# Patient Record
Sex: FEMALE | Race: BLACK OR AFRICAN AMERICAN | URBAN - METROPOLITAN AREA
[De-identification: names, ages, dates, MRNs, and addresses within clinical notes are randomized per-mention and may not be internally consistent; named-entity substitution may affect disease eponyms.]

---

## 2017-01-13 LAB
AVG GLU, 10930: 118 MG/DL (ref 91–123)
HBA1C MFR BLD HPLC: 5.7 % (ref 4.8–5.9)

## 2017-01-23 NOTE — PROGRESS NOTES
Normal a1c. Will discuss @ upcoming visit.   1/30/2017  7:30 AM    Oksana Fisher MD       [address] [place] [address]

## 2017-01-30 ENCOUNTER — OFFICE VISIT (OUTPATIENT)
Dept: FAMILY MEDICINE CLINIC | Age: 57
End: 2017-01-30

## 2017-01-30 VITALS
OXYGEN SATURATION: 97 % | SYSTOLIC BLOOD PRESSURE: 126 MMHG | HEIGHT: 66 IN | HEART RATE: 72 BPM | TEMPERATURE: 96.9 F | DIASTOLIC BLOOD PRESSURE: 86 MMHG | WEIGHT: 208.8 LBS | BODY MASS INDEX: 33.56 KG/M2 | RESPIRATION RATE: 18 BRPM

## 2017-01-30 DIAGNOSIS — G47.62 NOCTURNAL LEG CRAMPS: ICD-10-CM

## 2017-01-30 DIAGNOSIS — E66.9 OBESITY, UNSPECIFIED OBESITY SEVERITY, UNSPECIFIED OBESITY TYPE: ICD-10-CM

## 2017-01-30 DIAGNOSIS — E55.9 VITAMIN D DEFICIENCY: ICD-10-CM

## 2017-01-30 DIAGNOSIS — G47.26 SHIFT WORK SLEEP DISORDER: ICD-10-CM

## 2017-01-30 DIAGNOSIS — R06.83 SNORING: ICD-10-CM

## 2017-01-30 DIAGNOSIS — Z00.00 ROUTINE HEALTH MAINTENANCE: Primary | ICD-10-CM

## 2017-01-30 RX ORDER — LANOLIN ALCOHOL/MO/W.PET/CERES
65 CREAM (GRAM) TOPICAL
COMMUNITY
End: 2019-10-08

## 2017-01-30 RX ORDER — BISMUTH SUBSALICYLATE 262 MG
1 TABLET,CHEWABLE ORAL DAILY
COMMUNITY

## 2017-01-30 NOTE — PROGRESS NOTES
[facility], [place]  Primary Care Office Visit - Complete Physical    Iris Radha Li is a 61 y.o. female presenting for annual physical.  : 1960     Assessment/Plan:   Kate was seen today for complete physical and fatigue. Diagnoses and all orders for this visit:    Routine health maintenance  Up to date on HM. Up to date on Td (not on aP). Obesity, unspecified obesity severity, unspecified obesity type  I have reviewed/discussed the above normal BMI with the patient. I have recommended the following interventions: dietary management education, guidance, and counseling . The plan is as follows: I have counseled this patient on diet and exercise regimens. .      Vitamin D deficiency  Well controlled. Last VitD wnl. Continue OTC VitD. Shift work sleep disorder  Snoring  epworth score = 7  New issue, causing fatigue. Encourage good sleep hygiene, shifting back to her usual schedule. If no improvement in fatigue in 2-3 weeks, to consider sleep medicine referral.    Nocturnal leg cramps  New issue. Continue OTC mag & potassium supplements. Start HEP for stretching, included in AVS      Management plan & patient instructions reviewed with Kate Herzog, who voiced understanding. This document may have been created with the aid of dictation software. Text may contain errors, particularly phonetic errors. Teodoro Redman MD  Internal Medicine, Family Medicine & Sports Medicine  2017, 7:50 AM    Patient Instructions (provided in AVS): To Do:  · Do some of the exercises below to keep your lower legs nice & flexible  · If your fatigue continues after a month. ..  Let me know and we will consider a sleep medicine referral    Calf Strain: Rehab Exercises    History:   Marian Higgins is a 61 y.o. female presenting for an annual physical.      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 Feels as though she could take a nap during the day, although she does not have the opportunity to do so, since she works as a . She does note that this fatigue seems to be improving over time, however is still ongoing. Her last night shift at the post office was 1/5/2017. She falls asleep at 9 PM, but then wakes up at 4:30 in the morning. She does snore. Also notes some nocturnal leg cramps, that have improved but not resolved with over-the-counter magnesium and potassium supplements. Otherwise is doing well. Peculiar Sleepiness Score: 7      History reviewed. No pertinent past medical history. Past Surgical History   Procedure Laterality Date    Hx tubal ligation  12    Hx gyn  2007     fibroid embolization      reports that she quit smoking about 34 years ago. She smoked 0.10 packs per day. She has never used smokeless tobacco. She reports that she drinks about 0.5 oz of alcohol per week  She reports that she does not use illicit drugs.   Social History     Social History Narrative     History   Smoking Status    Former Smoker    Packs/day: 0.10    Quit date: 3/7/1982   Smokeless Tobacco    Never Used     Family History   Problem Relation Age of Onset    Asthma Mother     Diabetes Mother     Diabetes Father     Hypertension Father     Heart Disease Maternal Grandfather 80     MI    Stroke Paternal Grandmother 68    Cancer Paternal Grandfather      Lung cancer     No Known Allergies    Problem List:      Patient Active Problem List    Diagnosis    Routine health maintenance     - mammo: normal (11/3/2015)      Postmenopausal state    Obesity    Varicose veins    Vitamin D deficiency    Back pain    Hepatitis B core antibody positive     Positive for Hepatitis B core antibody & surface antibody = \"past Hep B infection now immune\"         Medications:     Current Outpatient Prescriptions   Medication Sig    ferrous sulfate (IRON) 325 mg (65 mg iron) tablet Take 65 mg by mouth Daily (before breakfast).  multivitamin (ONE A DAY) tablet Take 1 Tab by mouth daily.  POTASSIUM CITRATE PO Take  by mouth daily. No current facility-administered medications for this visit. Review of Systems:     Review of Systems   Constitutional: Positive for malaise/fatigue. HENT: Negative. Eyes: Negative. Respiratory: Negative. Cardiovascular: Negative. Gastrointestinal: Negative. Genitourinary: Negative. Musculoskeletal: Positive for myalgias. Leg cramps   Skin: Negative. Neurological: Negative. Endo/Heme/Allergies: Negative. Psychiatric/Behavioral: Negative. Physical Assessment:   VS:    Visit Vitals    /86 (BP 1 Location: Right arm, BP Patient Position: Sitting)    Pulse 72    Temp 96.9 °F (36.1 °C) (Oral)    Resp 18    Ht 5' 6\" (1.676 m)    Wt 208 lb 12.8 oz (94.7 kg)    SpO2 97%    BMI 33.7 kg/m2       General:     Well-developed, well-nourished obese female, in NAD    Head:      PERRL, EOMI.  MMM, fair dentition, oropharynx otherwise WNL. No facial asymmetry noted. Ears:    Bilateral TMs wnl. Bilateral EACs wnl. Neck:      Neck supple, no thyromegaly  Cardiovasc:     No JVD. RRR, no MRG. Pulses 2+ and symmetric at distal extremities. Pulmonary:     Lungs clear bilaterally. Normal respiratory effort. Extremities:     No edema, no TTP bilateral calves. No joint effusions. LEs warm and well-perfused. (+) tight bilateral heel cords  Neuro:     Alert and oriented, CNs II-XII intact, no focal deficits. Skin:      No rashes noted. Psych:    pleasant, and conversant. Affect, mood & judgment appropriate.         Recent Labs & Imaging:     Lab Results   Component Value Date/Time    WBC 4.2 01/12/2017 10:56 AM    HGB 14.5 01/12/2017 10:56 AM    HCT 43.7 01/12/2017 10:56 AM    PLATELET 256 87/12/1508 10:56 AM    MCV 90 01/12/2017 10:56 AM     Lab Results   Component Value Date/Time    Sodium 145 01/12/2017 10:56 AM    Potassium 4.9 01/12/2017 10:56 AM    Chloride 102 01/12/2017 10:56 AM    CO2 26 01/12/2017 10:56 AM    Anion gap 17.0 01/12/2017 10:56 AM    Glucose 89 01/12/2017 10:56 AM    BUN 14 01/12/2017 10:56 AM    Creatinine 0.7 01/12/2017 10:56 AM    BUN/Creatinine ratio 21 03/11/2013 12:00 AM    GFR est non-AA 61 03/11/2013 12:00 AM    Calcium 9.9 01/12/2017 10:56 AM    Bilirubin, total 0.6 01/12/2017 10:56 AM    ALT 34 01/12/2017 10:56 AM    AST 24 01/12/2017 10:56 AM    Alk.  phosphatase 68 01/12/2017 10:56 AM    Protein, total 7.3 01/12/2017 10:56 AM    Albumin 4.6 01/12/2017 10:56 AM    Globulin 2.7 01/12/2017 10:56 AM    A-G Ratio 1.7 01/12/2017 10:56 AM     Lab Results   Component Value Date/Time    Cholesterol, total 214 01/12/2017 10:56 AM    HDL Cholesterol 40 01/12/2017 10:56 AM    LDL, calculated 152 01/12/2017 10:56 AM    VLDL, calculated 22 01/12/2017 10:56 AM    Triglyceride 112 01/12/2017 10:56 AM     Lab Results   Component Value Date/Time    TSH 1.17 01/12/2017 10:56 AM     Lab Results   Component Value Date/Time    VITAMIN D, 25-HYDROXY 39.9 01/12/2017 10:56 AM       Lab Results   Component Value Date/Time    Hemoglobin A1c 5.7 01/12/2017 10:56 AM

## 2017-01-30 NOTE — PATIENT INSTRUCTIONS
To Do:  · Do some of the exercises below to keep your lower legs nice & flexible  · If your fatigue continues after a month. .. Let me know and we will consider a sleep medicine referral           Calf Strain: Rehab Exercises  Your Care Instructions  Here are some examples of typical rehabilitation exercises for your condition. Start each exercise slowly. Ease off the exercise if you start to have pain. Your doctor or physical therapist will tell you when you can start these exercises and which ones will work best for you. How to do the exercises  Calf wall stretch (back knee straight)    3. Stand facing a wall with your hands on the wall at about eye level. Put your affected leg about a step behind your other leg. 4. Keeping your back leg straight and your back heel on the floor, bend your front knee and gently bring your hip and chest toward the wall until you feel a stretch in the calf of your back leg. 5. Hold the stretch for at least 15 to 30 seconds. 6. Repeat 2 to 4 times. Calf wall stretch (knees bent)    1. Stand facing a wall with your hands on the wall at about eye level. Put your affected leg about a step behind your other leg. 2. Keeping both heels on the floor, bend both knees. Then gently bring your hip and chest toward the wall until you feel a stretch in the calf of your back leg. 3. Hold the stretch for at least 15 to 30 seconds. 4. Repeat 2 to 4 times. Bilateral calf stretch (knees straight)    1. Place a book on the floor a few inches from a wall or countertop, and put the balls of your feet on it. Your heels should be on the floor. The book needs to be thick enough so that you can feel a gentle stretch in each calf. If you are not steady on your feet, hold on to a chair, counter, or wall while you do this stretch. 2. Keep your knees straight, and lean forward until you feel a stretch in each calf.   3. To get more stretch, add another book or use a thicker book, such as a phone book, a dictionary, or an encyclopedia. 4. Hold the stretch for at least 15 to 30 seconds. 5. Repeat 2 to 4 times. Bilateral calf stretch (knees bent)    1. Place a book on the floor a few inches from a wall or countertop, and put the balls of your feet on it. Your heels should be on the floor. The book needs to be thick enough so that you can feel a gentle stretch in each calf. If you are not steady on your feet, hold on to a chair, counter, or wall while you do this stretch. 2. Bend your knees, and lean forward until you feel a stretch in each calf. 3. To get more stretch, add another book or use a thicker book, such as a phone book, a dictionary, or an encyclopedia. 4. Hold the stretch for at least 15 to 30 seconds. 5. Repeat 2 to 4 times. Ankle plantarflexion    1. Sit with your affected leg straight and supported on the floor. Your other leg should be bent, with that foot flat on the floor. 2. Keeping your affected leg straight, gently flex your foot downward so your toes are pointed away from your body. Then slowly relax your foot to the starting position. 3. Repeat 8 to 12 times. Ankle dorsiflexion    1. Sit with your affected leg straight and supported on the floor. Your other leg should be bent, with that foot flat on the floor. 2. Keeping your leg straight, gently flex your foot back so your toes point upward. Then slowly relax your foot to the starting position. 3. Repeat 8 to 12 times. Bilateral heel raises on step    1. Stand on the bottom step of a staircase, facing up toward the stairs. Put the balls of your feet on the step. If you are not steady on your feet, hold on to the banister or wall. 2. Keeping both knees straight, slowly lift your heels above the step so that you are standing on your toes. Then slowly lower your heels below the step and toward the floor. 3. Return to the starting position, with your feet even with the step. 4. Repeat 8 to 12 times.   Follow-up care is a key part of your treatment and safety. Be sure to make and go to all appointments, and call your doctor if you are having problems. It's also a good idea to know your test results and keep a list of the medicines you take. Where can you learn more? Go to http://aziza-krunal.info/. Enter L214 in the search box to learn more about \"Calf Strain: Rehab Exercises. \"  Current as of: May 23, 2016  Content Version: 11.1  © 5733-4588 [employer]. Care instructions adapted under license by [employer] (which disclaims liability or warranty for this information). If you have questions about a medical condition or this instruction, always ask your healthcare professional. [address] any warranty or liability for your use of this information.

## 2017-01-30 NOTE — MR AVS SNAPSHOT
Visit Information Date & Time Provider Department Dept. Phone Encounter #  
 1[facility]30/2017  7:30 [phone] [id number] Follow-up Instructions Return in about 1 year (around 1/30/2018) for 30min follow-up. Upcoming Health Maintenance Date Due DTaP/Tdap/Td series (1 - Tdap) 6/21/2011 BREAST CANCER SCRN MAMMOGRAM 11/8/2018 PAP AKA CERVICAL CYTOLOGY 11/3/2020 COLONOSCOPY 7/30/2023 Allergies as of 1/30/2017  Review Complete On: 1/30/2017 By: Colin Walters MD  
 No Known Allergies Current Immunizations  Reviewed on 11/3/2015 Name Date Influenza Vaccine 11/3/2015  2:40 PM  
 TB Skin Test (PPD) Intradermal 9/26/2016  4:17 PM, 6/8/2015, 3/19/2013 Td, Adsorbed PF 6/20/2011 Not reviewed this visit Vitals BP Pulse Temp Resp Height(growth percentile) Weight(growth percentile) 126/86 (BP 1 Location: Right arm, BP Patient Position: Sitting) 72 96.9 °F (36.1 °C) (Oral) 18 5' 6\" (1.676 m) 208 lb 12.8 oz (94.7 kg) SpO2 BMI OB Status Smoking Status 97% 33.7 kg/m2 Postmenopausal Former Smoker Vitals History BMI and BSA Data Body Mass Index Body Surface Area 33.7 kg/m 2 2.1 m 2 Preferred Pharmacy Pharmacy Name Phone [phone]  [address] [phone] Your Updated Medication List  
  
   
This list is accurate as of: 1/30/17  8:14 AM.  Always use your most recent med list.  
  
  
  
  
 Iron 325 mg (65 mg iron) tablet Generic drug:  ferrous sulfate Take 65 mg by mouth Daily (before breakfast). multivitamin tablet Commonly known as:  ONE A DAY Take 1 Tab by mouth daily. POTASSIUM CITRATE PO Take  by mouth daily. Follow-up Instructions Return in about 1 year (around 1/30/2018) for 30min follow-up. Patient Instructions To Do: · Do some of the exercises below to keep your lower legs nice & flexible · If your fatigue continues after a month. .. Let me know and we will consider a sleep medicine referral 
 
 
 
  
Calf Strain: Rehab Exercises Your Care Instructions Here are some examples of typical rehabilitation exercises for your condition. Start each exercise slowly. Ease off the exercise if you start to have pain. Your doctor or physical therapist will tell you when you can start these exercises and which ones will work best for you. How to do the exercises Calf wall stretch (back knee straight) 3. Stand facing a wall with your hands on the wall at about eye level. Put your affected leg about a step behind your other leg. 4. Keeping your back leg straight and your back heel on the floor, bend your front knee and gently bring your hip and chest toward the wall until you feel a stretch in the calf of your back leg. 5. Hold the stretch for at least 15 to 30 seconds. 6. Repeat 2 to 4 times. Calf wall stretch (knees bent) 1. Stand facing a wall with your hands on the wall at about eye level. Put your affected leg about a step behind your other leg. 2. Keeping both heels on the floor, bend both knees. Then gently bring your hip and chest toward the wall until you feel a stretch in the calf of your back leg. 3. Hold the stretch for at least 15 to 30 seconds. 4. Repeat 2 to 4 times. Bilateral calf stretch (knees straight) 1. Place a book on the floor a few inches from a wall or countertop, and put the balls of your feet on it. Your heels should be on the floor. The book needs to be thick enough so that you can feel a gentle stretch in each calf. If you are not steady on your feet, hold on to a chair, counter, or wall while you do this stretch. 2. Keep your knees straight, and lean forward until you feel a stretch in each calf.  
3. To get more stretch, add another book or use a thicker book, such as a phone book, a dictionary, or an encyclopedia. 4. Hold the stretch for at least 15 to 30 seconds. 5. Repeat 2 to 4 times. Bilateral calf stretch (knees bent) 1. Place a book on the floor a few inches from a wall or countertop, and put the balls of your feet on it. Your heels should be on the floor. The book needs to be thick enough so that you can feel a gentle stretch in each calf. If you are not steady on your feet, hold on to a chair, counter, or wall while you do this stretch. 2. Bend your knees, and lean forward until you feel a stretch in each calf. 3. To get more stretch, add another book or use a thicker book, such as a phone book, a dictionary, or an encyclopedia. 4. Hold the stretch for at least 15 to 30 seconds. 5. Repeat 2 to 4 times. Ankle plantarflexion 1. Sit with your affected leg straight and supported on the floor. Your other leg should be bent, with that foot flat on the floor. 2. Keeping your affected leg straight, gently flex your foot downward so your toes are pointed away from your body. Then slowly relax your foot to the starting position. 3. Repeat 8 to 12 times. Ankle dorsiflexion 1. Sit with your affected leg straight and supported on the floor. Your other leg should be bent, with that foot flat on the floor. 2. Keeping your leg straight, gently flex your foot back so your toes point upward. Then slowly relax your foot to the starting position. 3. Repeat 8 to 12 times. Bilateral heel raises on step 1. Stand on the bottom step of a staircase, facing up toward the stairs. Put the balls of your feet on the step. If you are not steady on your feet, hold on to the banister or wall. 2. Keeping both knees straight, slowly lift your heels above the step so that you are standing on your toes. Then slowly lower your heels below the step and toward the floor. 3. Return to the starting position, with your feet even with the step. 4. Repeat 8 to 12 times. Follow-up care is a key part of your treatment and safety. Be sure to make and go to all appointments, and call your doctor if you are having problems. It's also a good idea to know your test results and keep a list of the medicines you take. Where can you learn more? Go to http://aziza-krunal.info/. Enter C582 in the search box to learn more about \"Calf Strain: Rehab Exercises. \" Current as of: May 23, 2016 Content Version: 11.1 © 8396-6176 [employer]. Care instructions adapted under license by [employer] (which disclaims liability or warranty for this information). If you have questions about a medical condition or this instruction, always ask your healthcare professional. [address] any warranty or liability for your use of this information. Introducing [facility]! [employer] introduces [employer] patient portal. Now you can access parts of your medical record, email your doctor's office, and request medication refills online. 1. In your internet browser, go to [email or web address] 2. Click on the First Time User? Click Here link in the Sign In box. You will see the New Member Sign Up page. 3. Enter your [employer] Access Code exactly as it appears below. You will not need to use this code after youve completed the sign-up process. If you do not sign up before the expiration date, you must request a new code. · [employer] Access Code: [email or web address] Expires: 3/13/2017 11:14 AM 
 
4. Enter the last four digits of your Social Security Number (xxxx) and Date of Birth (mm/dd/yyyy) as indicated and click Submit. You will be taken to the next sign-up page. 5. Create a KipCallt ID. This will be your [employer] login ID and cannot be changed, so think of one that is secure and easy to remember. 6. Create a KipCallt password. You can change your password at any time. 7. Enter your Password Reset Question and Answer. This can be used at a later time if you forget your password. 8. Enter your e-mail address. You will receive e-mail notification when new information is available in [address] 9. Click Sign Up. You can now view and download portions of your medical record. 10. Click the Download Summary menu link to download a portable copy of your medical information. If you have questions, please visit the Frequently Asked Questions section of the [employer] website. Remember, You[employer] is NOT to be used for urgent needs. For medical emergencies, dial 911. Now available from your iPhone and Android! Please provide this summary of care documentation to your next provider. Your primary care clinician is listed as Ting Galvez. If you have any questions after today's visit, please call [phone].

## 2017-01-30 NOTE — PROGRESS NOTES
Kate Herzog is a 61 y.o. female here for a cpe.    1. Have you been to the ER, urgent care clinic or hospitalized since your last visit? NO.     2. Have you seen or consulted any other health care providers outside of the [address] [place] [address] since your last visit (Include any pap smears or colon screening)? NO      Do you have an Advanced Directive? NO    Would you like information on Advanced Directives?  NO    Learning Assessment 11/3/2015   PRIMARY LEARNER Patient   HIGHEST LEVEL OF EDUCATION - PRIMARY LEARNER  > 4 YEARS OF COLLEGE   BARRIERS PRIMARY LEARNER NONE   PRIMARY LANGUAGE ENGLISH   LEARNER PREFERENCE PRIMARY DEMONSTRATION   ANSWERED BY Patient   RELATIONSHIP SELF

## 2017-10-05 ENCOUNTER — TELEPHONE (OUTPATIENT)
Dept: FAMILY MEDICINE CLINIC | Age: 57
End: 2017-10-05

## 2017-10-05 NOTE — TELEPHONE ENCOUNTER
Wanting a referral for Podiatry. Problems/Pain on the heels of her feet on her right foot. Advised it may require an appointment since we haven't seen since January. Prefers [place](she works over there) or [facility] in the area).

## 2017-10-06 NOTE — TELEPHONE ENCOUNTER
Please inform Kate Herzog that her insurance does not require a referral for her to go to podiatry. And I recommend either [facility] [place], or [facility].

## 2017-10-11 NOTE — TELEPHONE ENCOUNTER
Pt called back to speak with Ellie Bernard. I informed her of Dr. Yovani Garber message. She stated she understood and took the information down. Pt will call back to let Dr. La Him know when she has an appointment.

## 2018-02-15 ENCOUNTER — OFFICE VISIT (OUTPATIENT)
Dept: FAMILY MEDICINE CLINIC | Age: 58
End: 2018-02-15

## 2018-02-15 VITALS
HEART RATE: 72 BPM | WEIGHT: 212 LBS | DIASTOLIC BLOOD PRESSURE: 78 MMHG | BODY MASS INDEX: 34.07 KG/M2 | SYSTOLIC BLOOD PRESSURE: 118 MMHG | HEIGHT: 66 IN | OXYGEN SATURATION: 96 % | RESPIRATION RATE: 20 BRPM | TEMPERATURE: 98.9 F

## 2018-02-15 DIAGNOSIS — J06.9 VIRAL UPPER RESPIRATORY ILLNESS: Primary | ICD-10-CM

## 2018-02-15 DIAGNOSIS — Z01.84 IMMUNITY STATUS TESTING: ICD-10-CM

## 2018-02-15 NOTE — PATIENT INSTRUCTIONS
Increase fluids, rest, OTC analgesics, cough syrup and antihistamines as needed. Follow up for new symptoms, worsening symptoms or failure to improve. Upper Respiratory Infection (Cold): Care Instructions  Your Care Instructions    An upper respiratory infection, or URI, is an infection of the nose, sinuses, or throat. URIs are spread by coughs, sneezes, and direct contact. The common cold is the most frequent kind of URI. The flu and sinus infections are other kinds of URIs. Almost all URIs are caused by viruses. Antibiotics won't cure them. But you can treat most infections with home care. This may include drinking lots of fluids and taking over-the-counter pain medicine. You will probably feel better in 4 to 10 days. The doctor has checked you carefully, but problems can develop later. If you notice any problems or new symptoms, get medical treatment right away. Follow-up care is a key part of your treatment and safety. Be sure to make and go to all appointments, and call your doctor if you are having problems. It's also a good idea to know your test results and keep a list of the medicines you take. How can you care for yourself at home? · To prevent dehydration, drink plenty of fluids, enough so that your urine is light yellow or clear like water. Choose water and other caffeine-free clear liquids until you feel better. If you have kidney, heart, or liver disease and have to limit fluids, talk with your doctor before you increase the amount of fluids you drink. · Take an over-the-counter pain medicine, such as acetaminophen (Tylenol), ibuprofen (Advil, Motrin), or naproxen (Aleve). Read and follow all instructions on the label. · Before you use cough and cold medicines, check the label. These medicines may not be safe for young children or for people with certain health problems. · Be careful when taking over-the-counter cold or flu medicines and Tylenol at the same time.  Many of these medicines have acetaminophen, which is Tylenol. Read the labels to make sure that you are not taking more than the recommended dose. Too much acetaminophen (Tylenol) can be harmful. · Get plenty of rest.  · Do not smoke or allow others to smoke around you. If you need help quitting, talk to your doctor about stop-smoking programs and medicines. These can increase your chances of quitting for good. When should you call for help? Call 911 anytime you think you may need emergency care. For example, call if:  ? · You have severe trouble breathing. ?Call your doctor now or seek immediate medical care if:  ? · You seem to be getting much sicker. ? · You have new or worse trouble breathing. ? · You have a new or higher fever. ? · You have a new rash. ? Watch closely for changes in your health, and be sure to contact your doctor if:  ? · You have a new symptom, such as a sore throat, an earache, or sinus pain. ? · You cough more deeply or more often, especially if you notice more mucus or a change in the color of your mucus. ? · You do not get better as expected. Where can you learn more? Go to http://aziza-krunal.info/. Enter R043 in the search box to learn more about \"Upper Respiratory Infection (Cold): Care Instructions. \"  Current as of: May 12, 2017  Content Version: 11.4  © 5031-6266 Healthwise, Incorporated. Care instructions adapted under license by [employer] (which disclaims liability or warranty for this information). If you have questions about a medical condition or this instruction, always ask your healthcare professional. [place] [address] any warranty or liability for your use of this information.

## 2018-02-15 NOTE — LETTER
NOTIFICATION RETURN TO WORK / SCHOOL 
 
2/15/2018 10:13 AM 
 
Ms. Kate Herzog [address] [facility] [address] To Whom It May Concern: 
 
Kate Herzog is currently under the care of [address] [place] [address]. She will return to work/school on: 2/17/2018 If there are questions or concerns please have the patient contact our office. Sincerely, Jess White MD

## 2018-02-15 NOTE — PROGRESS NOTES
HISTORY OF PRESENT ILLNESS  Kate Mccall is a 61 y.o. female. Cold Symptoms   The history is provided by the patient. This is a new problem. Episode onset: 3-4 days ago. Associated symptoms include headaches (previously, not now). Pertinent negatives include no chills, no ear pain, no sore throat, no nausea and no vomiting. Patient Active Problem List   Diagnosis Code    Hepatitis B core antibody positive R76.8    Back pain M54.9    Vitamin D deficiency E55.9    Obesity E66.9    Varicose veins I83.90    Postmenopausal state Z78.0    Routine health maintenance Z00.00       Current Outpatient Prescriptions:     ferrous sulfate (IRON) 325 mg (65 mg iron) tablet, Take 65 mg by mouth Daily (before breakfast). , Disp: , Rfl:     multivitamin (ONE A DAY) tablet, Take 1 Tab by mouth daily. , Disp: , Rfl:       Review of Systems   Constitutional: Negative for chills and fever. HENT: Negative for ear pain and sore throat. Runny nose   Respiratory: Positive for cough. Negative for sputum production. Starting to feel better today, OTC medications have been effective   Gastrointestinal: Positive for diarrhea (6 days ago, now resolved). Negative for nausea and vomiting. Neurological: Positive for headaches (previously, not now). Visit Vitals    /78 (BP 1 Location: Left arm, BP Patient Position: Sitting)    Pulse 72    Temp 98.9 °F (37.2 °C) (Oral)    Resp 20    Ht 5' 6\" (1.676 m)    Wt 212 lb (96.2 kg)    SpO2 96%    BMI 34.22 kg/m2     Physical Exam   Constitutional: She is oriented to person, place, and time. She appears well-developed and well-nourished. HENT:   Head: Normocephalic. Right Ear: Tympanic membrane and ear canal normal.   Left Ear: Tympanic membrane and ear canal normal.   Mouth/Throat: Oropharynx is clear and moist.   Eyes: Conjunctivae and EOM are normal.   Neck: Neck supple. Cardiovascular: Normal rate, regular rhythm and normal heart sounds. Pulmonary/Chest: Effort normal and breath sounds normal.   Lymphadenopathy:     She has no cervical adenopathy. Neurological: She is alert and oriented to person, place, and time. Skin: Skin is warm and dry. Psychiatric: She has a normal mood and affect. Her behavior is normal.   Nursing note and vitals reviewed. ASSESSMENT and PLAN    ICD-10-CM ICD-9-CM    1. Viral upper respiratory illness J06.9 465.9     B97.89     Increase fluids, rest, OTC analgesics, cough syrup and antihistamines as needed. Follow up for new symptoms, worsening symptoms or failure to improve.

## 2018-02-15 NOTE — MR AVS SNAPSHOT
[address] [facility] [id number] [phone] Patient: Aric Wallace MRN: [id number] :1960 Visit Information Date & Time Provider Department Dept. Phone Encounter #  
 2/15/2018 10:00 AM Renetta Amezcua, 3 [facility] [phone] [id number] Upcoming Health Maintenance Date Due DTaP/Tdap/Td series (1 - Tdap) 2011 Influenza Age 5 to Adult 2017 BREAST CANCER SCRN MAMMOGRAM 2018 PAP AKA CERVICAL CYTOLOGY 11/3/2020 COLONOSCOPY 2023 Allergies as of 2/15/2018  Review Complete On: 2/15/2018 By: Renetta Amezcua MD  
 No Known Allergies Current Immunizations  Reviewed on 11/3/2015 Name Date Influenza Vaccine 11/3/2015  2:40 PM  
 TB Skin Test (PPD) Intradermal 2016  4:17 PM, 2015, 3/19/2013 Td, Adsorbed PF 2011 Not reviewed this visit You Were Diagnosed With   
  
 Codes Comments Viral upper respiratory illness    -  Primary ICD-10-CM: J06.9, B97.89 ICD-9-CM: 465.9 Vitals BP Pulse Temp Resp Height(growth percentile) Weight(growth percentile) 118/78 (BP 1 Location: Left arm, BP Patient Position: Sitting) 72 98.9 °F (37.2 °C) (Oral) 20 5' 6\" (1.676 m) 212 lb (96.2 kg) SpO2 BMI OB Status Smoking Status 96% 34.22 kg/m2 Postmenopausal Former Smoker BMI and BSA Data Body Mass Index Body Surface Area  
 34.22 kg/m 2 2.12 m 2 Preferred Pharmacy Pharmacy Name Phone [facility] [address] [facility] [address] [phone] Your Updated Medication List  
  
   
This list is accurate as of: 2/15/18 10:15 AM.  Always use your most recent med list.  
  
  
  
  
 Iron 325 mg (65 mg iron) tablet Generic drug:  ferrous sulfate Take 65 mg by mouth Daily (before breakfast). multivitamin tablet Commonly known as:  ONE A DAY Take 1 Tab by mouth daily. Patient Instructions Increase fluids, rest, OTC analgesics, cough syrup and antihistamines as needed. Follow up for new symptoms, worsening symptoms or failure to improve. Upper Respiratory Infection (Cold): Care Instructions Your Care Instructions An upper respiratory infection, or URI, is an infection of the nose, sinuses, or throat. URIs are spread by coughs, sneezes, and direct contact. The common cold is the most frequent kind of URI. The flu and sinus infections are other kinds of URIs. Almost all URIs are caused by viruses. Antibiotics won't cure them. But you can treat most infections with home care. This may include drinking lots of fluids and taking over-the-counter pain medicine. You will probably feel better in 4 to 10 days. The doctor has checked you carefully, but problems can develop later. If you notice any problems or new symptoms, get medical treatment right away. Follow-up care is a key part of your treatment and safety. Be sure to make and go to all appointments, and call your doctor if you are having problems. It's also a good idea to know your test results and keep a list of the medicines you take. How can you care for yourself at home? · To prevent dehydration, drink plenty of fluids, enough so that your urine is light yellow or clear like water. Choose water and other caffeine-free clear liquids until you feel better. If you have kidney, heart, or liver disease and have to limit fluids, talk with your doctor before you increase the amount of fluids you drink. · Take an over-the-counter pain medicine, such as acetaminophen (Tylenol), ibuprofen (Advil, Motrin), or naproxen (Aleve). Read and follow all instructions on the label. · Before you use cough and cold medicines, check the label. These medicines may not be safe for young children or for people with certain health problems. · Be careful when taking over-the-counter cold or flu medicines and Tylenol at the same time. Many of these medicines have acetaminophen, which is Tylenol. Read the labels to make sure that you are not taking more than the recommended dose. Too much acetaminophen (Tylenol) can be harmful. · Get plenty of rest. 
· Do not smoke or allow others to smoke around you. If you need help quitting, talk to your doctor about stop-smoking programs and medicines. These can increase your chances of quitting for good. When should you call for help? Call 911 anytime you think you may need emergency care. For example, call if: 
? · You have severe trouble breathing. ?Call your doctor now or seek immediate medical care if: 
? · You seem to be getting much sicker. ? · You have new or worse trouble breathing. ? · You have a new or higher fever. ? · You have a new rash. ? Watch closely for changes in your health, and be sure to contact your doctor if: 
? · You have a new symptom, such as a sore throat, an earache, or sinus pain. ? · You cough more deeply or more often, especially if you notice more mucus or a change in the color of your mucus. ? · You do not get better as expected. Where can you learn more? Go to http://aziza-krunal.info/. Enter S659 in the search box to learn more about \"Upper Respiratory Infection (Cold): Care Instructions. \" Current as of: May 12, 2017 Content Version: 11.4 © 0559-9023 [employer]. Care instructions adapted under license by [employer] (which disclaims liability or warranty for this information). If you have questions about a medical condition or this instruction, always ask your healthcare professional. [place] [address] any warranty or liability for your use of this information. Introducing [facility]!    
 [address] introduces [employer] patient portal. Now you can access parts of your medical record, email your doctor's office, and request medication refills online. 1. In your internet browser, go to [email or web address]"[email or web address]. [employer]/"[employer] 2. Click on the First Time User? Click Here link in the Sign In box. You will see the New Member Sign Up page. 3. Enter your Ensenda Access Code exactly as it appears below. You will not need to use this code after youve completed the sign-up process. If you do not sign up before the expiration date, you must request a new code. · Ensenda Access Code: [email or web address] Expires: 5/16/2018 10:15 AM 
 
4. Enter the last four digits of your Social Security Number (xxxx) and Date of Birth (mm/dd/yyyy) as indicated and click Submit. You will be taken to the next sign-up page. 5. Create a Ensenda ID. This will be your Ensenda login ID and cannot be changed, so think of one that is secure and easy to remember. 6. Create a Ensenda password. You can change your password at any time. 7. Enter your Password Reset Question and Answer. This can be used at a later time if you forget your password. 8. Enter your e-mail address. You will receive e-mail notification when new information is available in [address] 9. Click Sign Up. You can now view and download portions of your medical record. 10. Click the Download Summary menu link to download a portable copy of your medical information. If you have questions, please visit the Frequently Asked Questions section of the Ensenda website. Remember, Ensenda is NOT to be used for urgent needs. For medical emergencies, dial 911. Now available from your iPhone and Android! Please provide this summary of care documentation to your next provider. Your primary care clinician is listed as Wellington Euceda. If you have any questions after today's visit, please call [phone].

## 2018-02-15 NOTE — PROGRESS NOTES
Navarro Malik Herzog is a 61 y.o. female here for cold symptoms    Kate Aquino is a 61 y.o. female (: 1960) presenting to address:    Chief Complaint   Patient presents with    Cold Symptoms     pt states she's had a cough, congestion, wheezing, diarrhea for 4 days       Vitals:    02/15/18 1001   BP: 118/78   Pulse: 72   Resp: 20   Temp: 98.9 °F (37.2 °C)   TempSrc: Oral   SpO2: 96%   Weight: 212 lb (96.2 kg)   Height: 5' 6\" (1.676 m)   PainSc:   0 - No pain       Hearing/Vision:   No exam data present    Learning Assessment:     Learning Assessment 11/3/2015   PRIMARY LEARNER Patient   HIGHEST LEVEL OF EDUCATION - PRIMARY LEARNER  > 4 YEARS OF COLLEGE   BARRIERS PRIMARY LEARNER NONE   PRIMARY LANGUAGE ENGLISH   LEARNER PREFERENCE PRIMARY DEMONSTRATION   ANSWERED BY Patient   RELATIONSHIP SELF     Depression Screening:     PHQ over the last two weeks 2/15/2018   Little interest or pleasure in doing things Not at all   Feeling down, depressed or hopeless Not at all   Total Score PHQ 2 0     Fall Risk Assessment:   No flowsheet data found. Abuse Screening:     Abuse Screening Questionnaire 2014   Do you ever feel afraid of your partner? N   Are you in a relationship with someone who physically or mentally threatens you? N   Is it safe for you to go home? Y     Coordination of Care Questionaire:   1. Have you been to the ER, urgent care clinic since your last visit? Hospitalized since your last visit? NO    2. Have you seen or consulted any other health care providers outside of the [address] [place] [address] since your last visit? Include any pap smears or colon screening. NO    Advanced Directive:   1. Do you have an Advanced Directive? NO    2. Would you like information on Advanced Directives?  NO

## 2018-03-22 ENCOUNTER — TELEPHONE (OUTPATIENT)
Dept: FAMILY MEDICINE CLINIC | Age: 58
End: 2018-03-22

## 2018-03-22 NOTE — TELEPHONE ENCOUNTER
Please call Kate Herzog. Her form for [address] [employer] cannot be completed at this time because we do not have a significant amount of the required information. #1 It has been more than a year since she has been seen for an yearly well exam, thus she needs to schedule an appointment, particularly because the form does specify a doctor \"visit\". This is also important as her form requires up to date vision screening. #2  We have no immunization record on file for her. And I queried the state database, and they do not either. Is she certain of having received these vaccinations in the past?  If she is, we can order the titers. If she isn't, we can start her on the series. #3  Her form requires a 2 step PPD, with at least 7 days between them. We can start this before her appointment with me, as this whole process will require at least 14 days in order to complete.     #4 we only have a few flu vaccines left!!    ------------------------------------    Needs:  - vision screening  - 2 step PPD at least 7-10 days between placement  - MMR vaccine or titer  - varicella vaccine or titer  - HepB vaccine or titer  - Tdap (only had Td in 2011)  - flu vaccine

## 2018-03-22 NOTE — TELEPHONE ENCOUNTER
Patient informed appointment scheduled for 4/10/18 also offered for her to come in on Monday for nurse visit to start PPD and have flu shot however she states she cant due to work and will just have to wait to do it at scheduled appointment. She does state she had flu shot in Oct at old job but doesn't have date. informed her we could not put that on form unless we have proof.

## 2018-03-27 NOTE — TELEPHONE ENCOUNTER
Patient called and stated she will be coming in tomorrow morning to have PPD, TDAP and Flu done and is would like to have titers drawn as well. Please place orders.

## 2018-03-28 ENCOUNTER — CLINICAL SUPPORT (OUTPATIENT)
Dept: FAMILY MEDICINE CLINIC | Age: 58
End: 2018-03-28

## 2018-03-28 ENCOUNTER — HOSPITAL ENCOUNTER (OUTPATIENT)
Dept: LAB | Age: 58
Discharge: HOME | End: 2018-03-28
Payer: COMMERCIAL

## 2018-03-28 VITALS — TEMPERATURE: 97.4 F

## 2018-03-28 DIAGNOSIS — Z01.84 IMMUNITY STATUS TESTING: ICD-10-CM

## 2018-03-28 DIAGNOSIS — Z23 ENCOUNTER FOR IMMUNIZATION: Primary | ICD-10-CM

## 2018-03-28 DIAGNOSIS — Z11.1 SCREENING FOR TUBERCULOSIS: ICD-10-CM

## 2018-03-28 LAB — RUBV IGG SER-IMP: NORMAL

## 2018-03-28 PROCEDURE — 86765 RUBEOLA ANTIBODY: CPT | Performed by: FAMILY MEDICINE

## 2018-03-28 PROCEDURE — 36415 COLL VENOUS BLD VENIPUNCTURE: CPT | Performed by: FAMILY MEDICINE

## 2018-03-28 PROCEDURE — 86706 HEP B SURFACE ANTIBODY: CPT | Performed by: FAMILY MEDICINE

## 2018-03-28 PROCEDURE — 86762 RUBELLA ANTIBODY: CPT | Performed by: FAMILY MEDICINE

## 2018-03-28 PROCEDURE — 86787 VARICELLA-ZOSTER ANTIBODY: CPT | Performed by: FAMILY MEDICINE

## 2018-03-28 PROCEDURE — 86735 MUMPS ANTIBODY: CPT | Performed by: FAMILY MEDICINE

## 2018-03-28 NOTE — PROGRESS NOTES
PPD Placement note  Kate Kimberlee VelázquezShermanAdriana, 61 y.o. female is here today for placement of PPD test  Reason for PPD test: School  Pt taken PPD test before: yes  Verified in allergy area and with patient that they are not allergic to the products PPD is made of (Phenol or Tween). Yes  Is patient taking any oral or IV steroid medication now or have they taken it in the last month? no  Has the patient ever received the BCG vaccine?: no  Has the patient been in recent contact with anyone known or suspected of having active TB disease?: no       Date of exposure (if applicable): NA       Name of person they were exposed to (if applicable): NA  Patient's Country of origin?: [place]  O: Alert and oriented in NAD. P:  PPD placed on 3/28/2018. Patient advised to return for reading within 48-72 hours. Tdap & Flu Immunization/s administered 3/28/2018 by Mac Coe LPN with guardian's consent. Patient tolerated procedure well. No reactions noted.

## 2018-03-29 LAB
HBV SURFACE AB SER QL IA: POSITIVE
HBV SURFACE AB SERPL IA-ACNC: 160.55 MIU/ML
HEP BS AB COMMENT,HBSAC: NORMAL
MEV IGG SER IA-ACNC: >300 AU/ML
MUV IGG SER IA-ACNC: >300 AU/ML
VZV IGG SER IA-ACNC: 2217 INDEX

## 2018-03-30 ENCOUNTER — CLINICAL SUPPORT (OUTPATIENT)
Dept: FAMILY MEDICINE CLINIC | Age: 58
End: 2018-03-30

## 2018-03-30 DIAGNOSIS — Z11.1 PPD SCREENING TEST: Primary | ICD-10-CM

## 2018-03-30 LAB
MM INDURATION POC: 0 MM (ref 0–5)
PPD POC: NORMAL NEGATIVE

## 2018-03-30 NOTE — PROGRESS NOTES
PPD Reading Note  [facility] Result: 0 mm induration. Interpretation: Neg  Allergic reaction: no    Patient will return in on 4/4/18 for 2nd ppd placement.

## 2018-04-04 ENCOUNTER — CLINICAL SUPPORT (OUTPATIENT)
Dept: FAMILY MEDICINE CLINIC | Age: 58
End: 2018-04-04

## 2018-04-04 DIAGNOSIS — Z11.1 SCREENING FOR TUBERCULOSIS: Primary | ICD-10-CM

## 2018-04-04 NOTE — PROGRESS NOTES
PPD Placement note  Kate Kimberlee Rosenda, 61 y.o. female is here today for placement of PPD test  Reason for PPD test: School  Pt taken PPD test before: yes  Verified in allergy area and with patient that they are not allergic to the products PPD is made of (Phenol or Tween). Yes  Is patient taking any oral or IV steroid medication now or have they taken it in the last month? no  Has the patient ever received the BCG vaccine?: no  Has the patient been in recent contact with anyone known or suspected of having active TB disease?: no       Date of exposure (if applicable): N/A       Name of person they were exposed to (if applicable): [facility] of origin?: [place]  O: Alert and oriented in NAD. P:  PPD placed on 4/4/2018. Patient advised to return for reading within 48-72 hours.

## 2018-04-06 ENCOUNTER — CLINICAL SUPPORT (OUTPATIENT)
Dept: FAMILY MEDICINE CLINIC | Age: 58
End: 2018-04-06

## 2018-04-06 DIAGNOSIS — Z11.1 PPD SCREENING TEST: Primary | ICD-10-CM

## 2018-04-06 LAB
MM INDURATION POC: 0 MM (ref 0–5)
PPD POC: NORMAL NEGATIVE

## 2018-04-06 NOTE — PROGRESS NOTES
PPD Reading Note  PPD read and results [facility]. Result: 0 mm induration.   Interpretation: NEG  Allergic reaction: no

## 2018-04-10 ENCOUNTER — OFFICE VISIT (OUTPATIENT)
Dept: FAMILY MEDICINE CLINIC | Age: 58
End: 2018-04-10

## 2018-04-10 VITALS
SYSTOLIC BLOOD PRESSURE: 115 MMHG | OXYGEN SATURATION: 97 % | TEMPERATURE: 96.6 F | WEIGHT: 210.4 LBS | HEIGHT: 66 IN | DIASTOLIC BLOOD PRESSURE: 82 MMHG | RESPIRATION RATE: 18 BRPM | BODY MASS INDEX: 33.82 KG/M2 | HEART RATE: 72 BPM

## 2018-04-10 DIAGNOSIS — E66.09 CLASS 1 OBESITY DUE TO EXCESS CALORIES WITHOUT SERIOUS COMORBIDITY WITH BODY MASS INDEX (BMI) OF 33.0 TO 33.9 IN ADULT: ICD-10-CM

## 2018-04-10 DIAGNOSIS — E55.9 VITAMIN D DEFICIENCY: ICD-10-CM

## 2018-04-10 DIAGNOSIS — Z00.00 ROUTINE HEALTH MAINTENANCE: Primary | ICD-10-CM

## 2018-04-10 NOTE — MR AVS SNAPSHOT
[address] [place] [address] [address] [facility] [address] [phone] Patient: Piero Zepeda MRN: [id number] :1960 Visit Information Date & Time Provider Department Dept. Phone Encounter #  
 4/10/2018  7:30 AM Alexus Herbert, [employer] [phone] [id number] Follow-up Instructions Return in about 1 year (around 4/10/2019), or if symptoms worsen or fail to improve, for 30min complete physical.  
  
Upcoming Health Maintenance Date Due  
 BREAST CANCER SCRN MAMMOGRAM 3/21/2020 PAP AKA CERVICAL CYTOLOGY 11/3/2020 COLONOSCOPY 2023 DTaP/Tdap/Td series (2 - Td) 3/28/2028 Allergies as of 4/10/2018  Review Complete On: 4/10/2018 By: Alexus Herbert MD  
 No Known Allergies Current Immunizations  Reviewed on 4/10/2018 Name Date Influenza Vaccine 11/3/2015  2:40 PM  
 Influenza Vaccine (Quad) PF 3/28/2018  7:45 AM  
 TB Skin Test (PPD) Intradermal 2018  9:07 AM, 3/28/2018  7:45 AM, 2016  4:17 PM, 2015, 3/19/2013 Td, Adsorbed PF 2011 Tdap 3/28/2018  7:45 AM  
  
 Reviewed by Alexsu Herbert MD on 4/10/2018 at  8:05 AM  
You Were Diagnosed With   
  
 Codes Comments Routine health maintenance    -  Primary ICD-10-CM: Z00.00 ICD-9-CM: V70.0 Class 1 obesity due to excess calories without serious comorbidity with body mass index (BMI) of 33.0 to 33.9 in adult     ICD-10-CM: E66.09, Z68.33 
ICD-9-CM: 278.00, V85.33 Vitamin D deficiency     ICD-10-CM: E55.9 ICD-9-CM: 268.9 Vitals BP Pulse Temp Resp Height(growth percentile) Weight(growth percentile) 115/82 (BP 1 Location: Right arm, BP Patient Position: Sitting) 72 96.6 °F (35.9 °C) (Oral) 18 5' 6\" (1.676 m) 210 lb 6.4 oz (95.4 kg) SpO2 BMI OB Status Smoking Status 97% 33.96 kg/m2 Postmenopausal Former Smoker BMI and BSA Data Body Mass Index Body Surface Area  
 33.96 kg/m 2 2.11 m 2 Preferred Pharmacy Pharmacy Name Phone [facility] [address] [phone] Your Updated Medication List  
  
   
This list is accurate as of 4/10/18  8:07 AM.  Always use your most recent med list.  
  
  
  
  
 Iron 325 mg (65 mg iron) tablet Generic drug:  ferrous sulfate Take 65 mg by mouth Daily (before breakfast). multivitamin tablet Commonly known as:  ONE A DAY Take 1 Tab by mouth daily. Follow-up Instructions Return in about 1 year (around 4/10/2019), or if symptoms worsen or fail to improve, for 30min complete physical.  
  
  
Patient Instructions To Do: · Work on some of the eye exercises below [place] Exercises for Vertigo: Care Instructions Your Care Instructions Simple exercises can help you regain your balance when you have vertigo. If you have Ménière's disease, benign paroxysmal positional vertigo (BPPV), or another inner ear problem, you may have vertigo off and on. Do these exercises first thing in the morning and before you go to bed. You might get dizzy when you first start them. If this happens, try to do them for at least 5 minutes. Do a group of exercises at a time, starting at the top of the list. It may take several weeks before you can do all the exercises without feeling dizzy. Follow-up care is a key part of your treatment and safety. Be sure to make and go to all appointments, and call your doctor if you are having problems. It's also a good idea to know your test results and keep a list of the medicines you take. How can you care for yourself at home? Exercise 1 While sitting on the side of the bed and holding your head still: 
· Look up as far as you can. · Look down as far as you can. · Look from side to side as far as you can. · Stretch your arm straight out in front of you. Focus on your index finger. Continue to focus on your finger while you bring it to your nose. Exercise 2 While sitting on the side of the bed: · Bring your head as far back as you can. · Bring your head forward to touch your chin to your chest. 
· Turn your head from side to side. · Do these exercises first with your eyes open. Then try with your eyes closed. Exercise 3 While sitting on the side of the bed: · Shrug your shoulders straight upward, then relax them. · Bend over and try to touch the ground with your fingers. Then go back to a sitting position. · Toss a small ball from one hand to the other. Throw the ball higher than your eyes so you have to look up. Exercise 4 While standing (with someone close by if you feel uncomfortable): 
· Repeat Exercise 1. 
· Repeat Exercise 2. 
· Pass a ball between your legs and above your head. · Sit down and then stand up. Repeat. Turn around in a [place] a different way each time you stand. · With someone close by to help you, try the above exercises with your eyes closed. Exercise 5 In a room that is cleared of obstacles: 
· Walk to a corner of the room, turn to your right, and walk back to the starting point. Now, repeat and turn left. · Walk up and down a slope. Now try stairs. · While holding on to someone's arm, try these exercises with your eyes closed. When should you call for help? Watch closely for changes in your health, and be sure to contact your doctor if: 
? · You do not get better as expected. Where can you learn more? Go to http://aziza-krunal.info/. Enter G440 in the search box to learn more about \"[place] Exercises for Vertigo: Care Instructions. \" Current as of: May 12, 2017 Content Version: 11.4 © 2441-4408 [employer]erve [employer]  Care instructions adapted under license by Vigo (which disclaims liability or warranty for this information). If you have questions about a medical condition or this instruction, always ask your healthcare professional. [address] any warranty or liability for your use of this information. Work-Life Balance: Care Instructions Your Care Instructions Do you ever feel like there is not enough time to do all of the things you have to do, and no time at all for the things you enjoy? If so, you are not alone. On average, people in the [place] have worked more and more hours each year since 1970. But in recent years, fewer people say they want to take on more at work, even if they would get promoted or get paid more money. More and more workers say they want time to spend with their families and to do things that are important to them. Do you ever feel: · That you always have more and more work to do at your job? · That too many people depend on you every day? · That you never have enough time for your family or friends? · That you never have time for hobbies or things you enjoy? · That each second of your day is scheduled? If you answered \"yes\" to any of these questions, take steps at work and at home to get your life into balance. Follow-up care is a key part of your treatment and safety. Be sure to make and go to all appointments, and call your doctor if you are having problems. How can you care for yourself at home? Manage your time · Focus on the important things. Taking on too much can wear you out. Look at how you spend your time, and redirect your focus. Learn to say \"no\" and let go of things that do not matter. · Set one small goal at a time. Use a day planner. Break large projects into smaller ones. · Ask for help. Let your children, your spouse, your coworkers, and other people in your life help you get things done. · Leave your job at the office.  If you give up free time to get more work done, you may pay for it with stress. If your job offers a flexible work schedule, use it to fit your own work style. For instance, come in earlier to have a longer lunch break, or make time for a yoga class or workout during your workday. · Unplug. Do not let technology (such as your cell phone or the Internet) erase the line between your time and your employer's time. Lower job stress Job stress causes trouble at work and at home. At work, you may worry about things you have not had time to do at home. At home, you may worry about your job. This cycle upsets your work-life balance. Lowering your job stress can get your life back in balance. Job stress can be caused by: · Pressure and deadlines. · Heavy workloads or long hours. · Not being allowed to make decisions. · Health and safety hazards. · Feeling you may lose your job. · Unclear or changing job duties. · Too much responsibility. · Work that is very tiring or boring. Do any of these things bother you? Consider talking with your boss to change things. There are some things that you may not be able to control. But even a few small changes might help lower your stress. Take advantage of programs at work Businesses make money and are better off in other ways if their employees are healthy and happy. For this reason, many companies have programs to help balance work life and home life. These programs may include: · Flexible schedules and hours. · Time off for family reasons, education, or community service. · Being able to work from home. · Employee assistance programs to provide counseling. · Child-care programs. Check to see if your company has any of these or other programs that could help you. If not, consider talking to your boss about why work-life balance programs make good business sense.  Even if your company does not start an official program, you may be able to get flexible hours, time off, or the ability to do some work from home. Know when to quit If you are truly unhappy because of a stressful job, and if the suggestions here have not worked, it may be time to think about changing jobs or changing careers. But before you quit, take time to research your options. Where can you learn more? Go to http://aziza-krunal.info/. Enter R705 in the search box to learn more about \"Work-Life Balance: Care Instructions. \" Current as of: July 26, 2016 Content Version: 11.4 © 8894-7902 GigaBryte. Care instructions adapted under license by [employer] (which disclaims liability or warranty for this information). If you have questions about a medical condition or this instruction, always ask your healthcare professional. [place] [address] any warranty or liability for your use of this information. Well Visit, Women 48 to 72: Care Instructions Your Care Instructions Physical exams can help you stay healthy. Your doctor has checked your overall health and may have suggested ways to take good care of yourself. He or she also may have recommended tests. At home, you can help prevent illness with healthy eating, regular exercise, and other steps. Follow-up care is a key part of your treatment and safety. Be sure to make and go to all appointments, and call your doctor if you are having problems. It's also a good idea to know your test results and keep a list of the medicines you take. How can you care for yourself at home? · Reach and stay at a healthy weight. This will lower your risk for many problems, such as obesity, diabetes, heart disease, and high blood pressure. · Get at least 30 minutes of exercise on most days of the week. Walking is a good choice. You also may want to do other activities, such as running, swimming, cycling, or playing tennis or team sports. · Do not smoke. Smoking can make health problems worse. If you need help quitting, talk to your doctor about stop-smoking programs and medicines. These can increase your chances of quitting for good. · Protect your skin from too much sun. When you're outdoors from 10 a.m. to 4 p.m., stay in the shade or cover up with clothing and a hat with a wide brim. Wear sunglasses that block UV rays. Even when it's cloudy, put broad-spectrum sunscreen (SPF 30 or higher) on any exposed skin. · See a dentist one or two times a year for checkups and to have your teeth cleaned. · Wear a seat belt in the car. · Limit alcohol to 1 drink a day. Too much alcohol can cause health problems. Follow your doctor's advice about when to have certain tests. These tests can spot problems early. · Cholesterol. Your doctor will tell you how often to have this done based on your age, family history, or other things that can increase your risk for heart attack and stroke. · Blood pressure. Have your blood pressure checked during a routine doctor visit. Your doctor will tell you how often to check your blood pressure based on your age, your blood pressure results, and other factors. · Mammogram. Ask your doctor how often you should have a mammogram, which is an X-ray of your breasts. A mammogram can spot breast cancer before it can be felt and when it is easiest to treat. · Pap test and pelvic exam. Ask your doctor how often you should have a Pap test. You may not need to have a Pap test as often as you used to. · Vision. Have your eyes checked every year or two or as often as your doctor suggests. Some experts recommend that you have yearly exams for glaucoma and other age-related eye problems starting at age 48. · Hearing. Tell your doctor if you notice any change in your hearing. You can have tests to find out how well you hear. · Diabetes. Ask your doctor whether you should have tests for diabetes. · Colon cancer. You should begin tests for colon cancer at age 48. You may have one of several tests. Your doctor will tell you how often to have tests based on your age and risk. Risks include whether you already had a precancerous polyp removed from your colon or whether your parents, sisters and brothers, or children have had colon cancer. · Thyroid disease. Talk to your doctor about whether to have your thyroid checked as part of a regular physical exam. Women have an increased chance of a thyroid problem. · Osteoporosis. You should begin tests for bone density at age 72. If you are younger than 72, ask your doctor whether you have factors that may increase your risk for this disease. You may want to have this test before age 72. · Heart attack and stroke risk. At least every 4 to 6 years, you should have your risk for heart attack and stroke assessed. Your doctor uses factors such as your age, blood pressure, cholesterol, and whether you smoke or have diabetes to show what your risk for a heart attack or stroke is over the next 10 years. When should you call for help? Watch closely for changes in your health, and be sure to contact your doctor if you have any problems or symptoms that concern you. Where can you learn more? Go to http://aziza-krunal.info/. Enter M441 in the search box to learn more about \"Well Visit, Women 50 to 72: Care Instructions. \" Current as of: May 12, 2017 Content Version: 11.4 © 0336-4379 [employer] Valve Technologies. Care instructions adapted under license by Luminoso (which disclaims liability or warranty for this information). If you have questions about a medical condition or this instruction, always ask your healthcare professional. [place] [address] any warranty or liability for your use of this information. Introducing [facility]!    
 [facility] introduces Hemosphere patient portal. Now you can access parts of your medical record, email your doctor's office, and request medication refills online. 1. In your internet browser, go to [email or web address] 2. Click on the First Time User? Click Here link in the Sign In box. You will see the New Member Sign Up page. 3. Enter your Cloudcity Access Code exactly as it appears below. You will not need to use this code after youve completed the sign-up process. If you do not sign up before the expiration date, you must request a new code. · Cloudcity Access Code: [email or web address] Expires: 5/16/2018 11:15 AM 
 
4. Enter the last four digits of your Social Security Number (xxxx) and Date of Birth (mm/dd/yyyy) as indicated and click Submit. You will be taken to the next sign-up page. 5. Create a Cloudcity ID. This will be your Cloudcity login ID and cannot be changed, so think of one that is secure and easy to remember. 6. Create a Cloudcity password. You can change your password at any time. 7. Enter your Password Reset Question and Answer. This can be used at a later time if you forget your password. 8. Enter your e-mail address. You will receive e-mail notification when new information is available in [address] 9. Click Sign Up. You can now view and download portions of your medical record. 10. Click the Download Summary menu link to download a portable copy of your medical information. If you have questions, please visit the Frequently Asked Questions section of the Cloudcity website. Remember, Cloudcity is NOT to be used for urgent needs. For medical emergencies, dial 911. Now available from your iPhone and Android! Please provide this summary of care documentation to your next provider. Your primary care clinician is listed as Anay Markham. If you have any questions after today's visit, please call [phone].

## 2018-04-10 NOTE — PROGRESS NOTES
Mindi Monique is a 61 y.o. female (: 1960) presenting to address:    Chief Complaint   Patient presents with    Physical     pain scale 0/10    Dizziness       Vitals:    04/10/18 0738   BP: 115/82   Pulse: 72   Resp: 18   Temp: 96.6 °F (35.9 °C)   TempSrc: Oral   SpO2: 97%   Weight: 210 lb 6.4 oz (95.4 kg)   Height: 5' 6\" (1.676 m)   PainSc:   0 - No pain       Hearing/Vision:      Hearing Screening    125Hz 250Hz 500Hz 1000Hz 2000Hz 3000Hz 4000Hz 6000Hz 8000Hz   Right ear:   20 20 20  20     Left ear:   20 20 20  20        Visual Acuity Screening    Right eye Left eye Both eyes   Without correction:      With correction: 20/30 20/30 20/20       Learning Assessment:     Learning Assessment 11/3/2015   PRIMARY LEARNER Patient   HIGHEST LEVEL OF EDUCATION - PRIMARY LEARNER  > 4 YEARS OF COLLEGE   BARRIERS PRIMARY LEARNER NONE   PRIMARY LANGUAGE ENGLISH   LEARNER PREFERENCE PRIMARY DEMONSTRATION   ANSWERED BY Patient   RELATIONSHIP SELF     Depression Screening:     PHQ over the last two weeks 4/10/2018   Little interest or pleasure in doing things Not at all   Feeling down, depressed or hopeless Not at all   Total Score PHQ 2 0     Fall Risk Assessment:   No flowsheet data found. Abuse Screening:     Abuse Screening Questionnaire 2014   Do you ever feel afraid of your partner? N   Are you in a relationship with someone who physically or mentally threatens you? N   Is it safe for you to go home? Y     Coordination of Care Questionaire:   1. Have you been to the ER, urgent care clinic since your last visit? Hospitalized since your last visit? NO    2. Have you seen or consulted any other health care providers outside of the [address] [place] [address] since your last visit? Include any pap smears or colon screening. NO    Advanced Directive:   1. Do you have an Advanced Directive? NO    2. Would you like information on Advanced Directives?  NO

## 2018-04-10 NOTE — PROGRESS NOTES
[facility]  Primary Care Office Visit - Complete Physical    Kate Harris is a 61 y.o. female presenting for annual physical.  : 1960     Assessment/Plan:       ICD-10-CM   1. Routine health maintenance  Up-to-date on health maintenance items. Completed school entrance physical for [employer] phlebotomy program.     Z00.00   2. Class 1 obesity due to excess calories without serious comorbidity with body mass index (BMI) of 33.0 to 33.9 in adult  Ongoing. I have reviewed/discussed the above normal BMI with the patient. I have recommended the following interventions: dietary management education, guidance, and counseling, encourage exercise and monitor weight. E66.09   3. Vitamin D deficiency  Encourage over-the-counter daily supplementation for maintenance. E55.9       Management plan & patient instructions reviewed with Kate Herzog, who voiced understanding. This document may have been created with the aid of dictation software. Text may contain errors, particularly phonetic errors. Jareth Avila MD  Internal Medicine, Family Medicine & Sports Medicine  4/10/2018, 7:33 AM      Patient Instructions     To Do:  · Work on some of the eye exercises below                         History:   Anson Fofana is a 61 y.o. female presenting for an annual physical.     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    ocassional dizziness while working, mainly when standing at the window, instead of moving around. No nausea. \"just lightheaded\". Hx of vertigo  Is hydrating, making sure she eats. No past medical history on file. Past Surgical History:   Procedure Laterality Date    HX GYN  2007    fibroid embolization    HX TUBAL LIGATION        reports that she quit smoking about 36 years ago. She smoked 0.10 packs per day.  She has never used smokeless tobacco. She reports that she drinks about 0.5 oz of alcohol per week  She reports that she does not use illicit drugs. Social History     Social History Narrative    Works as     (1/30/2017)     History   Smoking Status    Former Smoker    Packs/day: 0.10    Quit date: 3/7/1982   Smokeless Tobacco    Never Used     Family History   Problem Relation Age of Onset    Asthma Mother     Diabetes Mother     Diabetes Father     Hypertension Father     Heart Disease Maternal Grandfather 80     MI    Stroke Paternal Grandmother 68    Cancer Paternal Grandfather      Lung cancer     No Known Allergies    Problem List:      Patient Active Problem List    Diagnosis    Routine health maintenance     - mammo: normal (11/3/2015)      Postmenopausal state    Obesity    Varicose veins    Vitamin D deficiency    Back pain    Hepatitis B core antibody positive     Positive for Hepatitis B core antibody & surface antibody = \"past Hep B infection now immune\"         Medications:     Current Outpatient Prescriptions   Medication Sig    multivitamin (ONE A DAY) tablet Take 1 Tab by mouth daily.  ferrous sulfate (IRON) 325 mg (65 mg iron) tablet Take 65 mg by mouth Daily (before breakfast). No current facility-administered medications for this visit. Review of Systems:     Review of Systems   Constitutional: Negative for chills and fever. HENT: Negative for ear pain and sore throat. Respiratory: Negative for cough and shortness of breath. Cardiovascular: Negative for chest pain and palpitations. Gastrointestinal: Negative for abdominal pain. Genitourinary: Negative for dysuria. Musculoskeletal: Negative for myalgias. Skin: Negative for rash. Neurological: Positive for dizziness. Negative for speech change, focal weakness and headaches. Endo/Heme/Allergies: Does not bruise/bleed easily. Psychiatric/Behavioral: Negative for depression. The patient is not nervous/anxious and does not have insomnia. Physical Assessment:   VS:    Visit Vitals    /82 (BP 1 Location: Right arm, BP Patient Position: Sitting)    Pulse 72    Temp 96.6 °F (35.9 °C) (Oral)    Resp 18    Ht 5' 6\" (1.676 m)    Wt 210 lb 6.4 oz (95.4 kg)    SpO2 97%    BMI 33.96 kg/m2       Physical Exam   Constitutional: She is oriented to person, place, and time. She appears well-developed and well-nourished. Obese   HENT:   Head: Normocephalic and atraumatic. Eyes: EOM are normal.   Neck: Neck supple. No thyromegaly present. Cardiovascular: Normal rate, regular rhythm and intact distal pulses. No murmur heard. Pulmonary/Chest: Effort normal and breath sounds normal. She has no wheezes. She has no rales. Musculoskeletal: Normal range of motion. Neurological: She is alert and oriented to person, place, and time. No cranial nerve deficit. Coordination normal.   Negative [place]-Hallpike; negative vestibular ocular motor screen   Skin: Skin is warm and dry. She is not diaphoretic. Psychiatric: She has a normal mood and affect. Her behavior is normal. Judgment and thought content normal.   Nursing note reviewed.

## 2018-04-10 NOTE — LETTER
NOTIFICATION RETURN TO WORK / SCHOOL 
 
4/10/2018 8:13 AM 
 
Ms. Kate Herzog [address] [address] To Whom It May Concern: 
 
Kate Herzog is currently under the care of [address] [place] [address]. She will return to work/school on: 4/10/2018 If there are questions or concerns please have the patient contact our office. Sincerely, Surya Pulido MD

## 2019-10-08 ENCOUNTER — OFFICE VISIT (OUTPATIENT)
Dept: FAMILY MEDICINE CLINIC | Age: 59
End: 2019-10-08

## 2019-10-08 VITALS
OXYGEN SATURATION: 98 % | HEIGHT: 66 IN | HEART RATE: 67 BPM | TEMPERATURE: 97.6 F | WEIGHT: 218.2 LBS | DIASTOLIC BLOOD PRESSURE: 86 MMHG | SYSTOLIC BLOOD PRESSURE: 133 MMHG | BODY MASS INDEX: 35.07 KG/M2 | RESPIRATION RATE: 16 BRPM

## 2019-10-08 DIAGNOSIS — K04.7 DENTAL INFECTION: ICD-10-CM

## 2019-10-08 DIAGNOSIS — E66.01 SEVERE OBESITY (HCC): ICD-10-CM

## 2019-10-08 DIAGNOSIS — Z23 ENCOUNTER FOR IMMUNIZATION: ICD-10-CM

## 2019-10-08 DIAGNOSIS — N95.1 MENOPAUSAL STATE: ICD-10-CM

## 2019-10-08 DIAGNOSIS — Z00.00 ROUTINE ADULT HEALTH MAINTENANCE: Primary | ICD-10-CM

## 2019-10-08 DIAGNOSIS — L30.9 ECZEMA, UNSPECIFIED TYPE: ICD-10-CM

## 2019-10-08 RX ORDER — HYDROCORTISONE BUTYRATE 1 MG/G
CREAM TOPICAL 2 TIMES DAILY
Qty: 15 G | Refills: 0 | Status: SHIPPED | OUTPATIENT
Start: 2019-10-08 | End: 2019-12-02

## 2019-10-08 NOTE — PATIENT INSTRUCTIONS
To Do:  
- return to the office at your convenience for FASTING (nothing to eat/drink 8-10 hours before, except water) bloodwork; lab opens @ 7:30am M-F 
- please call and let me know the name of your antibiotic, but definitely get it refilled and restart while working on getting an appt with dentist

## 2019-10-08 NOTE — PROGRESS NOTES
[facility] [address] [facility]  Primary Care Office Visit - Complete Physical    Kate Cj Redding is a 60 y.o. female presenting for annual physical.  : 1960        Assessment/Plan:     1. Routine adult health maintenance  - HEMOGLOBIN A1C WITH EAG; Future  - CBC WITH AUTOMATED DIFF; Future  - METABOLIC PANEL, COMPREHENSIVE; Future  - T4, FREE; Future  - TSH 3RD GENERATION; Future  - LIPID PANEL; Future    2. Encounter for immunization  - INFLUENZA VIRUS VAC QUAD,SPLIT,PRESV FREE SYRINGE IM  - [place] IMMUNIZ ADMIN,1 SINGLE/COMB VAC/TOXOID    3. Severe obesity ([facility] [address]  Ongoing. I have reviewed/discussed the above normal BMI with the patient. I have recommended the following interventions: dietary management education, guidance, and counseling, encourage exercise and monitor weight.  - HEMOGLOBIN A1C WITH EAG; Future    4. Dental infection  Initial encounter. Previously tx with ABx from dentist, awaiting appt (and finances) for oral surgeon. States she has a \"refill on the ABx\". Asked Kate Herzog to send name and dosing of ABx, and recommend refilling and/or following up with dentist.    5. Menopausal state  In .    6. Eczema, unspecified type  Initial encounter, R elbow. - hydrocortisone butyrate (LOCOID) 0.1 % topical cream; Apply  to affected area two (2) times a day. Dispense: 15 g; Refill: 0      Orders & Diagnoses associated with This Encounter:         ICD-10-CM ICD-9-CM   1. Routine adult health maintenance Z00.00 V70.0   2. Severe obesity ([facility] [address] E66.01 278.01   3. Dental infection K04.7 522.4   4. Menopausal state N95.1 627.2   5. Encounter for immunization Z23 V03.89   6.  Eczema, unspecified type L30.9 692.9       Orders Placed This Encounter    Influenza virus vaccine (QUADRIVALENT PRES FREE SYRINGE) IM (34751)    HEMOGLOBIN A1C WITH EAG     Standing Status:   Future     Standing Expiration Date:   10/8/2020    CBC WITH AUTOMATED DIFF     Standing Status:   Future Standing Expiration Date:   36/8/8087    METABOLIC PANEL, COMPREHENSIVE     Standing Status:   Future     Standing Expiration Date:   10/8/2020    T4, FREE     Standing Status:   Future     Standing Expiration Date:   10/8/2020    TSH 3RD GENERATION     Standing Status:   Future     Standing Expiration Date:   10/8/2020    LIPID PANEL     Standing Status:   Future     Standing Expiration Date:   10/8/2020    [place] IMMUNIZ ADMIN,1 SINGLE/COMB VAC/TOXOID    OTHER     Sig: Take 1 Cap by mouth two (2) days a week. DME    hydrocortisone butyrate (LOCOID) 0.1 % topical cream     Sig: Apply  to affected area two (2) times a day. Dispense:  15 g     Refill:  0     Management plan & patient instructions reviewed with Kate Herzog, who voiced understanding. This document may have been created with the aid of dictation software. Text may contain errors, particularly phonetic errors. Agnieszka Garcia MD  Internal Medicine, Family Medicine & Sports Medicine  10/8/2019         Subjective   History:   Marisa VelázquezGideon is a 60 y.o. female presenting for an annual physical.    Was recently tx for a dental infection of L lower jaw. Took 10d of unspecified abx 2 weeks ago. Felt better but since has some \"jaw tightness / fullness\" on that side. Is waiting to save up money to see the oral surgeon. Does have a refill on the ABx from the dentist.    Is postmenopausal since 2010. Otherwise doing okay. Notes a hyperpigmented dry area on extensor surface of elbows,  R >> L. Using lotion to no avail. History reviewed. No pertinent past medical history. Past Surgical History:   Procedure Laterality Date    HX GYN  2007    fibroid embolization    HX TUBAL LIGATION  1991      reports that she quit smoking about 37 years ago. She smoked 0.10 packs per day. She has never used smokeless tobacco. She reports that she drinks about 0.8 standard drinks of alcohol per week.  She reports that she does not use drugs. Social History     Social History Narrative    Works as . 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)     Social History     Tobacco Use   Smoking Status Former Smoker    Packs/day: 0.10    Last attempt to quit: 3/7/1982    Years since quittin.6   Smokeless Tobacco Never Used     Family History   Problem Relation Age of Onset    Asthma Mother     Diabetes Mother     Diabetes Father     Hypertension Father     Heart Disease Maternal Grandfather 80        MI    Stroke Paternal Grandmother 68    Cancer Paternal Grandfather         Lung cancer     No Known Allergies    Problem List:      Patient Active Problem List    Diagnosis    Severe obesity ([address]    Routine health maintenance     - mammo: normal (11/3/2015)      Postmenopausal state    Obesity    Varicose veins    Vitamin D deficiency    Back pain    Hepatitis B core antibody positive     Positive for Hepatitis B core antibody & surface antibody = \"past Hep B infection now immune\"         Medications:     Current Outpatient Medications   Medication Sig    OTHER Take 1 Cap by mouth two (2) days a week. DME    hydrocortisone butyrate (LOCOID) 0.1 % topical cream Apply  to affected area two (2) times a day.  multivitamin (ONE A DAY) tablet Take 1 Tab by mouth daily. No current facility-administered medications for this visit. Review of Systems:     Review of Systems   Constitutional: Negative for chills and fever. HENT: Negative for ear pain and sore throat. Facial tightness, jaw pain, see HPI   Respiratory: Negative for cough and shortness of breath. Cardiovascular: Negative for chest pain and palpitations. Gastrointestinal: Negative for abdominal pain. Genitourinary: Negative for dysuria. Musculoskeletal: Negative for myalgias. Skin: Positive for itching (elbow, at times) and rash.    Neurological: Negative for speech change, focal weakness and headaches. Endo/Heme/Allergies: Does not bruise/bleed easily. Psychiatric/Behavioral: Negative for depression. The patient is not nervous/anxious and does not have insomnia. Objective   Physical Assessment:   VS:    Visit Vitals  /86 (BP 1 Location: Right arm, BP Patient Position: Sitting)   Pulse 67   Temp 97.6 °F (36.4 °C) (Oral)   Resp 16   Ht 5' 6\" (1.676 m)   Wt 218 lb 3.2 oz (99 kg)   SpO2 98%   BMI 35.22 kg/m²       Physical Exam   Constitutional: She is oriented to person, place, and time. She appears well-developed and well-nourished. obese   HENT:   Head: Normocephalic and atraumatic. Right Ear: Tympanic membrane, external ear and ear canal normal. No drainage. No middle ear effusion. No decreased hearing is noted. Left Ear: Tympanic membrane, external ear and ear canal normal. No drainage. No middle ear effusion. No decreased hearing is noted. Nose: Right sinus exhibits no maxillary sinus tenderness and no frontal sinus tenderness. Left sinus exhibits no maxillary sinus tenderness and no frontal sinus tenderness. Eyes: EOM are normal.   Neck: Neck supple. No thyromegaly present. Cardiovascular: Normal rate, regular rhythm and intact distal pulses. No murmur heard. Pulmonary/Chest: Effort normal and breath sounds normal. She has no wheezes. She has no rales. Musculoskeletal: Normal range of motion. Neurological: She is alert and oriented to person, place, and time. No cranial nerve deficit. Coordination normal.   Skin: Skin is warm and dry. She is not diaphoretic. Psychiatric: She has a normal mood and affect. Her behavior is normal. Judgment and thought content normal.   Nursing note reviewed.

## 2019-10-08 NOTE — PROGRESS NOTES
Salvatore Herzog is a 60 y.o. female (: 1960) presenting to address:    Chief Complaint   Patient presents with    Complete Physical    Other     jaw locks up at times        Vitals:    10/08/19 1133   BP: 133/86   Pulse: 67   Resp: 16   Temp: 97.6 °F (36.4 °C)   TempSrc: Oral   SpO2: 98%   Weight: 218 lb 3.2 oz (99 kg)   Height: 5' 6\" (1.676 m)   PainSc:   0 - No pain       Hearing/Vision:   No exam data present    Learning Assessment:     Learning Assessment 11/3/2015   PRIMARY LEARNER Patient   HIGHEST LEVEL OF EDUCATION - PRIMARY LEARNER  > 4 YEARS OF COLLEGE   BARRIERS PRIMARY LEARNER NONE   PRIMARY LANGUAGE ENGLISH   LEARNER PREFERENCE PRIMARY DEMONSTRATION   ANSWERED BY Patient   RELATIONSHIP SELF     Depression Screening:     3 most recent PHQ Screens 10/8/2019   Little interest or pleasure in doing things Not at all   Feeling down, depressed, irritable, or hopeless Not at all   Total Score PHQ 2 0     Fall Risk Assessment:   No flowsheet data found. Abuse Screening:     Abuse Screening Questionnaire 2014   Do you ever feel afraid of your partner? N   Are you in a relationship with someone who physically or mentally threatens you? N   Is it safe for you to go home? Y     Coordination of Care Questionaire:   1. Have you been to the ER, urgent care clinic since your last visit? Hospitalized since your last visit? NO    2. Have you seen or consulted any other health care providers outside of the [address] [place] [address] since your last visit? Include any pap smears or colon screening. NO    Advanced Directive:   1. Do you have an Advanced Directive? NO    2. Would you like information on Advanced Directives? NO    Flu Immunization/s administered 10/8/2019 by Freddie Johnson LPN with patients consent. Patient tolerated procedure well. No reactions noted.     \

## 2019-10-10 ENCOUNTER — HOSPITAL ENCOUNTER (OUTPATIENT)
Dept: LAB | Age: 59
Discharge: HOME | End: 2019-10-10
Payer: COMMERCIAL

## 2019-10-10 DIAGNOSIS — Z00.00 ROUTINE ADULT HEALTH MAINTENANCE: ICD-10-CM

## 2019-10-10 DIAGNOSIS — E66.01 SEVERE OBESITY (HCC): ICD-10-CM

## 2019-10-10 LAB
ALBUMIN SERPL-MCNC: 3.9 G/DL (ref 3.4–5)
ALBUMIN/GLOB SERPL: 1.3 {RATIO} (ref 0.8–1.7)
ALP SERPL-CCNC: 68 U/L (ref 45–117)
ALT SERPL-CCNC: 23 U/L (ref 13–56)
ANION GAP SERPL CALC-SCNC: 21 MMOL/L (ref 3–18)
AST SERPL-CCNC: 11 U/L (ref 10–38)
BASOPHILS # BLD: 0 K/UL (ref 0–0.1)
BASOPHILS NFR BLD: 0 % (ref 0–2)
BILIRUB SERPL-MCNC: 0.4 MG/DL (ref 0.2–1)
BUN SERPL-MCNC: 15 MG/DL (ref 7–18)
BUN/CREAT SERPL: 16 (ref 12–20)
CALCIUM SERPL-MCNC: 9.5 MG/DL (ref 8.5–10.1)
CHLORIDE SERPL-SCNC: 108 MMOL/L (ref 100–111)
CHOLEST SERPL-MCNC: 214 MG/DL
CO2 SERPL-SCNC: 14 MMOL/L (ref 21–32)
CREAT SERPL-MCNC: 0.91 MG/DL (ref 0.6–1.3)
DIFFERENTIAL METHOD BLD: ABNORMAL
EOSINOPHIL # BLD: 0.2 K/UL (ref 0–0.4)
EOSINOPHIL NFR BLD: 4 % (ref 0–5)
ERYTHROCYTE [DISTWIDTH] IN BLOOD BY AUTOMATED COUNT: 12.8 % (ref 11.6–14.5)
GLOBULIN SER CALC-MCNC: 3 G/DL (ref 2–4)
GLUCOSE SERPL-MCNC: 93 MG/DL (ref 74–99)
HCT VFR BLD AUTO: 40.5 % (ref 35–45)
HDLC SERPL-MCNC: 41 MG/DL (ref 40–60)
HDLC SERPL: 5.2 {RATIO} (ref 0–5)
HGB BLD-MCNC: 13.5 G/DL (ref 12–16)
LDLC SERPL CALC-MCNC: 145 MG/DL (ref 0–100)
LIPID PROFILE,FLP: ABNORMAL
LYMPHOCYTES # BLD: 1.4 K/UL (ref 0.9–3.6)
LYMPHOCYTES NFR BLD: 43 % (ref 21–52)
MCH RBC QN AUTO: 30 PG (ref 24–34)
MCHC RBC AUTO-ENTMCNC: 33.3 G/DL (ref 31–37)
MCV RBC AUTO: 90 FL (ref 74–97)
MONOCYTES # BLD: 0.4 K/UL (ref 0.05–1.2)
MONOCYTES NFR BLD: 11 % (ref 3–10)
NEUTS SEG # BLD: 1.4 K/UL (ref 1.8–8)
NEUTS SEG NFR BLD: 42 % (ref 40–73)
PLATELET # BLD AUTO: 238 K/UL (ref 135–420)
PMV BLD AUTO: 11.2 FL (ref 9.2–11.8)
POTASSIUM SERPL-SCNC: 4.3 MMOL/L (ref 3.5–5.5)
PROT SERPL-MCNC: 6.9 G/DL (ref 6.4–8.2)
RBC # BLD AUTO: 4.5 M/UL (ref 4.2–5.3)
SODIUM SERPL-SCNC: 143 MMOL/L (ref 136–145)
T4 FREE SERPL-MCNC: 1 NG/DL (ref 0.7–1.5)
TRIGL SERPL-MCNC: 140 MG/DL (ref ?–150)
TSH SERPL DL<=0.05 MIU/L-ACNC: 1.33 UIU/ML (ref 0.36–3.74)
VLDLC SERPL CALC-MCNC: 28 MG/DL
WBC # BLD AUTO: 3.4 K/UL (ref 4.6–13.2)

## 2019-10-10 PROCEDURE — 84443 ASSAY THYROID STIM HORMONE: CPT

## 2019-10-10 PROCEDURE — 85025 COMPLETE CBC W/AUTO DIFF WBC: CPT

## 2019-10-10 PROCEDURE — 80053 COMPREHEN METABOLIC PANEL: CPT

## 2019-10-10 PROCEDURE — 80061 LIPID PANEL: CPT

## 2019-10-10 PROCEDURE — 36415 COLL VENOUS BLD VENIPUNCTURE: CPT

## 2019-10-10 PROCEDURE — 84439 ASSAY OF FREE THYROXINE: CPT

## 2019-10-10 PROCEDURE — 83036 HEMOGLOBIN GLYCOSYLATED A1C: CPT

## 2019-10-11 LAB
EST. AVERAGE GLUCOSE BLD GHB EST-MCNC: 108 MG/DL
HBA1C MFR BLD: 5.4 % (ref 4.2–5.6)

## 2019-12-02 ENCOUNTER — OFFICE VISIT (OUTPATIENT)
Dept: FAMILY MEDICINE CLINIC | Age: 59
End: 2019-12-02

## 2019-12-02 VITALS
RESPIRATION RATE: 16 BRPM | DIASTOLIC BLOOD PRESSURE: 88 MMHG | OXYGEN SATURATION: 98 % | TEMPERATURE: 97.5 F | WEIGHT: 226.4 LBS | HEART RATE: 69 BPM | HEIGHT: 66 IN | SYSTOLIC BLOOD PRESSURE: 145 MMHG | BODY MASS INDEX: 36.38 KG/M2

## 2019-12-02 DIAGNOSIS — M53.3 PAIN OF LEFT SACROILIAC JOINT: Primary | ICD-10-CM

## 2019-12-02 DIAGNOSIS — M54.50 LEFT-SIDED LOW BACK PAIN WITHOUT SCIATICA, UNSPECIFIED CHRONICITY: ICD-10-CM

## 2019-12-02 RX ORDER — NAPROXEN 500 MG/1
500 TABLET ORAL 2 TIMES DAILY WITH MEALS
Qty: 60 TAB | Refills: 0 | Status: SHIPPED | OUTPATIENT
Start: 2019-12-02 | End: 2019-12-31

## 2019-12-02 RX ORDER — CHOLECALCIFEROL (VITAMIN D3) 125 MCG
440 CAPSULE ORAL
COMMUNITY
End: 2019-12-31 | Stop reason: DRUGHIGH

## 2019-12-02 NOTE — PROGRESS NOTES
[address] [facility]  Primary Care Office Visit - Problem-Oriented    : 1960   Kate Floyd is a 60 y.o. female presenting for  Chief Complaint   Patient presents with    Back Pain     started in tailbone last Wednesday and has moved into left lower back/hip area            Assessment/Plan:     1. Pain of left sacroiliac joint  Initial encounter. Continue heat and ice. Change analgesia to Rx naproxen. Stop other NSAIDs. Okay to add tylenol in addition. Start HEP. Prefers to avoid MSK relaxants  Future considerations: formal physical therapy, trial of MSK relaxants  - naproxen (NAPROSYN) 500 mg tablet; Take 1 Tab by mouth two (2) times daily (with meals). Dispense: 60 Tab; Refill: 0    Spent 25min face-to-face, >50% spent on counseling & patient education, specifically HEP. Orders & Diagnoses associated with This Encounter:         ICD-10-CM ICD-9-CM   1. Pain of left sacroiliac joint M53.3 724.6       Orders Placed This Encounter    naproxen sodium (ALEVE) 220 mg cap     Sig: Take 440 Caps by mouth every four (4) hours as needed for Pain.  docosahexanoic acid/epa (FISH OIL PO)     Sig: Take 1 Cap by mouth daily.  naproxen (NAPROSYN) 500 mg tablet     Sig: Take 1 Tab by mouth two (2) times daily (with meals). Dispense:  60 Tab     Refill:  0         This document may have been created with the aid of dictation software. Text may contain errors, particularly phonetic errors. Reviewed management plan & instructions with patient, who voiced understanding. Amy Sanchez MD  Internal Medicine, Family Medicine & Sports Medicine  2019    Subjective   History:   Salvatore Herzog is a 60 y.o. female presenting to address:  Chief Complaint   Patient presents with    Back Pain     started in tailbone last Wednesday and has moved into left lower back/hip area       No inciting trauma.    Was sitting in chair eating, went to get up to stand, and had \"lots pressure in tailbone. \"  Was painful to stand straight up. Took some aleve. Got on full body massager with heat on bed. Better when on it, but worse when got up. Then tried hot bath with epsom salt. \"trying to walk it out\". Worst can be 8 of 10. Current 5-6 of 10. Worsens if sitting for too long and then getting up. No waking her at night. No change in BM / bladder habits. Has had this before, about 2-3 years ago. No numbness \"yet\", however had numbness assoc with episode in the past, maybe in the 1 foot. Also tried biofreeze. Next day, had some painful hand cramps. \"felt like a spasm\". Hand spasms never happened before. History reviewed. No pertinent past medical history. Past Surgical History:   Procedure Laterality Date    HX GYN  2007    fibroid embolization    HX TUBAL LIGATION        reports that she quit smoking about 37 years ago. She smoked 0.10 packs per day. She has never used smokeless tobacco. She reports current alcohol use of about 0.8 standard drinks of alcohol per week. She reports that she does not use drugs. Social History     Patient does not qualify to have social determinant information on file (likely too young). 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)     Social History     Tobacco Use   Smoking Status Former Smoker    Packs/day: 0.10    Last attempt to quit: 3/7/1982    Years since quittin.7   Smokeless Tobacco Never Used     Family History   Problem Relation Age of Onset    Asthma Mother     Diabetes Mother     Diabetes Father     Hypertension Father     Heart Disease Maternal Grandfather 80        MI    Stroke Paternal Grandmother 68    Cancer Paternal Grandfather         Lung cancer     No Known Allergies    Problem List:      Patient Active Problem List    Diagnosis    Severe obesity ([address]    Eczema    Menopausal state    Routine health maintenance - mammo: normal (11/3/2015)      Postmenopausal state    Obesity    Varicose veins    Vitamin D deficiency    Back pain    Hepatitis B core antibody positive     Positive for Hepatitis B core antibody & surface antibody = \"past Hep B infection now immune\"         Medications:     Current Outpatient Medications   Medication Sig    naproxen sodium (ALEVE) 220 mg cap Take 440 Caps by mouth every four (4) hours as needed for Pain.  docosahexanoic acid/epa (FISH OIL PO) Take 1 Cap by mouth daily.  naproxen (NAPROSYN) 500 mg tablet Take 1 Tab by mouth two (2) times daily (with meals).  multivitamin (ONE A DAY) tablet Take 1 Tab by mouth daily. No current facility-administered medications for this visit. Review of Systems:     Review of Systems   Gastrointestinal: Negative for constipation and diarrhea. Genitourinary: Negative for dysuria. Musculoskeletal: Positive for back pain, joint pain and myalgias (spasms). Negative for falls. Skin: Negative for rash. Neurological: Negative for tingling, tremors, sensory change and focal weakness. Objective   Physical Assessment:   VS:    Vitals:    12/02/19 1455   BP: 145/88   Pulse: 69   Resp: 16   Temp: 97.5 °F (36.4 °C)   TempSrc: Oral   SpO2: 98%   Weight: 226 lb 6.4 oz (102.7 kg)   Height: 5' 6\" (1.676 m)   PainSc:   5   PainLoc: Back         Physical Exam  Nursing note reviewed. Constitutional:       General: She is not in acute distress. Appearance: She is well-developed. She is obese. She is not diaphoretic. HENT:      Head: Normocephalic and atraumatic. Eyes:      Conjunctiva/sclera: Conjunctivae normal.   Neck:      Musculoskeletal: Neck supple. Musculoskeletal:      Right hip: She exhibits normal range of motion, normal strength and no bony tenderness. Left hip: She exhibits normal range of motion, normal strength and no bony tenderness.       Lumbar back: She exhibits bony tenderness (L >> R SIj pain) and spasm (L glute max & med TrP with pain). She exhibits normal range of motion and no deformity. Back:    Skin:     General: Skin is warm and dry. Findings: No rash. Neurological:      Mental Status: She is alert and oriented to person, place, and time. Cranial Nerves: Cranial nerves are intact. Sensory: Sensation is intact. Comments: Slow get up and go from seated position; EHL 5/5 Bilaterally; able to heel walk & toe walk without pain   Psychiatric:         Behavior: Behavior normal.         Thought Content:  Thought content normal.

## 2019-12-02 NOTE — PATIENT INSTRUCTIONS
To Do: - use the prescription strength naproxen instead of your OTC aleve. Okay to add tylenol to this if needed, but do NOT use any additional non-steroidal anti-inflammatories (ibuprofen, advil, aleve) 
- continue to use heat. You may also consider alternating heat and ice. 
- when this is a bit more calm, start doing the exercises below. Notes from your doctor: 
- if not getting better, other options include: formal physical therapy, muscle relaxants (even something we would only use at night if necessary) TESTING RESULTS Results will be released to [address] Normal results will be sent via mail. If you have questions about your results, please schedule a follow up appointment to discuss with your PCP. MEDICATION REFILLS Please allow at least 2 business days for refill requests to be addressed. Medication refills may be requested through your pharmacy. Refills will not be provided by the after hours/on call provider. Sacroiliac Joint Pain: Care Instructions Your Care Instructions The sacroiliac joints connect the spine and each side of the pelvis. These joints bear the weight and stress of your torso. This makes them easy to injure. Injury or overuse of these joints may cause low back pain. Stress on these joints can cause joint pain. Sacroiliac joint pain is more common in pregnant women. Certain kinds of arthritis also may cause this type of joint pain. Home treatment may help you feel better. So can avoiding activities that stress your back. Your doctor also may recommend physical therapy. This may include doing exercises and stretches to help with pain. You may also learn to use good posture. Follow-up care is a key part of your treatment and safety. Be sure to make and go to all appointments, and call your doctor if you are having problems. It's also a good idea to know your test results and keep a list of the medicines you take. How can you care for yourself at home? · Ask your doctor about light exercises that may help your back pain. Try to do light activity throughout the day. But make sure to take rests as needed. Find a comfortable position for rest, but don't stay in one position for too long. Avoid activities that cause pain. · To apply heat, put a warm water bottle, a heating pad set on low, or a warm cloth on your back. Do not go to sleep with a heating pad on your skin. · Put ice or a cold pack on your back for 10 to 20 minutes at a time. Put a thin cloth between the ice and your skin. · If the doctor gave you a prescription medicine for pain, take it as prescribed. · If you are not taking a prescription pain medicine, ask your doctor if you can take an over-the-counter pain medicine, such as acetaminophen (Tylenol), ibuprofen (Advil, Motrin), or naproxen (Aleve). Read and follow all instructions on the label. Take pain medicines exactly as directed. · Do not take two or more pain medicines at the same time unless the doctor told you to. Many pain medicines have acetaminophen, which is Tylenol. Too much acetaminophen (Tylenol) can be harmful. · To prevent future back pain, do exercises to stretch and strengthen your back and stomach. Learn how to use good posture, safe lifting techniques, and proper body mechanics. When should you call for help? Call 911 anytime you think you may need emergency care. For example, call if: 
  · You are unable to move a leg at all.  
[facility] your doctor now or seek immediate medical care if: 
  · You have new or worse symptoms in your legs or buttocks. Symptoms may include: 
? Numbness or tingling. ? Weakness. ? Pain.  
  · You lose bladder or bowel control.  
 Watch closely for changes in your health, and be sure to contact your doctor if: 
  · You are not getting better as expected. Where can you learn more? Go to http://aziza-krunal.info/. Enter V583 in the search box to learn more about \"Sacroiliac Joint Pain: Care Instructions. \" Current as of: June 26, 2019 Content Version: 12.2 © 6256-8958 [employer]. Care instructions adapted under license by [employer] (which disclaims liability or warranty for this information). If you have questions about a medical condition or this instruction, always ask your healthcare professional. [place] [address] any warranty or liability for your use of this information. Sacroiliac Pain: Exercises Introduction Here are some examples of exercises for you to try. The exercises may be suggested for a condition or for rehabilitation. Start each exercise slowly. Ease off the exercises if you start to have pain. You will be told when to start these exercises and which ones will work best for you. How to do the exercises Knee-to-chest stretch 1. Do not do the knee-to-chest exercise if it causes or increases back or leg pain. 2. Lie on your back with your knees bent and your feet flat on the floor. You can put a small pillow under your head and neck if it is more comfortable. 3. Grasp your hands under one knee and bring the knee to your chest, keeping the other foot flat on the floor. 4. Keep your lower back pressed to the floor. Hold for at least 15 to 30 seconds. 5. Relax and lower the knee to the starting position. Repeat with the other leg. 6. Repeat 2 to 4 times with each leg. 7. To get more stretch, keep your other leg flat on the floor while pulling your knee to your chest. 
 
Bridging 1. Lie on your back with both knees bent. Your knees should be bent about 90 degrees. 2. Tighten your belly muscles by pulling in your belly button toward your spine. Then push your feet into the floor, squeeze your buttocks, and lift your hips off the floor until your shoulders, hips, and knees are all in a straight line. 3. Hold for about 6 seconds as you continue to breathe normally, and then slowly lower your hips back down to the floor and rest for up to 10 seconds. 4. Repeat 8 to 12 times. Hip extension 1. Get down on your hands and knees on the floor. 2. Keeping your back and neck straight, lift one leg straight out behind you. When you lift your leg, keep your hips level. Don't let your back twist, and don't let your hip drop toward the floor. 3. Hold for 6 seconds. Repeat 8 to 12 times with each leg. 4. If you feel steady and strong when you do this exercise, you can make it more difficult. To do this, when you lift your leg, also lift the opposite arm straight out in front of you. For example, lift the left leg and the right arm at the same time. (This is sometimes called the \"bird dog exercise. \") Hold for 6 seconds, and repeat 8 to 12 times on each side. Clamshell 1. Lie on your side with a pillow under your head. Keep your feet and knees together and your knees bent. 2. Raise your top knee, but keep your feet together. Do not let your hips roll back. Your legs should open up like a clamshell. 3. Hold for 6 seconds. 4. Slowly lower your knee back down. Rest for 10 seconds. 5. Repeat 8 to 12 times. 6. Switch to your other side and repeat steps 1 through 5. Hamstring wall stretch 1. Lie on your back in a doorway, with one leg through the open door. 2. Slide your affected leg up the wall to straighten your knee. You should feel a gentle stretch down the back of your leg. 1. Do not arch your back. 2. Do not bend either knee. 3. Keep one heel touching the floor and the other heel touching the wall. Do not point your toes. 3. Hold the stretch for at least 1 minute to begin. Then try to lengthen the time you hold the stretch to as long as 6 minutes. 4. Switch legs, and repeat steps 1 through 3. 
5. Repeat 2 to 4 times.  
6. If you do not have a place to do this exercise in a doorway, there is another way to do it: 
7. Lie on your back, and bend one knee. 8. Loop a towel under the ball and toes of that foot, and hold the ends of the towel in your hands. 9. Straighten your knee, and slowly pull back on the towel. You should feel a gentle stretch down the back of your leg. 10. Switch legs, and repeat steps 1 through 3. 
11. Repeat 2 to 4 times. Lower abdominal strengthening 1. Lie on your back with your knees bent and your feet flat on the floor. 2. Tighten your belly muscles by pulling your belly button in toward your spine. 3. Lift one foot off the floor and bring your knee toward your chest, so that your knee is straight above your hip and your leg is bent like the letter \"L. \" 
4. Lift the other knee up to the same position. 5. Lower one leg at a time to the starting position. 6. Keep alternating legs until you have lifted each leg 8 to 12 times. 7. Be sure to keep your belly muscles tight and your back still as you are moving your legs. Be sure to breathe normally. Piriformis stretch 1. Lie on your back with your legs straight. 2. Lift your affected leg, and bend your knee. With your opposite hand, reach across your body, and then gently pull your knee toward your opposite shoulder. 3. Hold the stretch for 15 to 30 seconds. 4. Switch legs and repeat steps 1 through 3. 
5. Repeat 2 to 4 times. Follow-up care is a key part of your treatment and safety. Be sure to make and go to all appointments, and call your doctor if you are having problems. It's also a good idea to know your test results and keep a list of the medicines you take. Where can you learn more? Go to http://aizza-krunal.info/. Enter L079 in the search box to learn more about \"Sacroiliac Pain: Exercises. \" Current as of: June 26, 2019 Content Version: 12.2 © 9354-5961 [employer].  Care instructions adapted under license by [address] (which disclaims liability or warranty for this information). If you have questions about a medical condition or this instruction, always ask your healthcare professional. [employer] any warranty or liability for your use of this information.

## 2019-12-02 NOTE — PROGRESS NOTES
Sharyn Herzog is a 60 y.o. female (: 1960) presenting to address:    Chief Complaint   Patient presents with    Back Pain     started in [address] last Wednesday and has moved into left lower back/hip area       Vitals:    19 1455   BP: 145/88   Pulse: 69   Resp: 16   Temp: 97.5 °F (36.4 °C)   TempSrc: Oral   SpO2: 98%   Weight: 226 lb 6.4 oz (102.7 kg)   Height: 5' 6\" (1.676 m)   PainSc:   5   PainLoc: Back       Hearing/Vision:   No exam data present    Learning Assessment:     Learning Assessment 11/3/2015   PRIMARY LEARNER Patient   HIGHEST LEVEL OF EDUCATION - PRIMARY LEARNER  > 4 YEARS OF COLLEGE   BARRIERS PRIMARY LEARNER NONE   PRIMARY LANGUAGE ENGLISH   LEARNER PREFERENCE PRIMARY DEMONSTRATION   ANSWERED BY Patient   RELATIONSHIP SELF     Depression Screening:     3 most recent PHQ Screens 2019   Little interest or pleasure in doing things Not at all   Feeling down, depressed, irritable, or hopeless Not at all   Total Score PHQ 2 0     Fall Risk Assessment:   No flowsheet data found. Abuse Screening:     Abuse Screening Questionnaire 2014   Do you ever feel afraid of your partner? N   Are you in a relationship with someone who physically or mentally threatens you? N   Is it safe for you to go home? Y     Coordination of Care Questionaire:   1. Have you been to the ER, urgent care clinic since your last visit? Hospitalized since your last visit? NO    2. Have you seen or consulted any other health care providers outside of the [address] [place] [address] since your last visit? Include any pap smears or colon screening. NO    Advanced Directive:   1. Do you have an Advanced Directive? NO    2. Would you like information on Advanced Directives?  NO

## 2019-12-06 ENCOUNTER — TELEPHONE (OUTPATIENT)
Dept: INTERNAL MEDICINE CLINIC | Age: 59
End: 2019-12-06

## 2019-12-06 RX ORDER — TIZANIDINE 2 MG/1
2-4 TABLET ORAL
Qty: 60 TAB | Refills: 1 | Status: SHIPPED | OUTPATIENT
Start: 2019-12-06 | End: 2021-05-10

## 2019-12-06 NOTE — TELEPHONE ENCOUNTER
Please call Kate Herzog and inform her that \"finding the right muscle relaxant is a trial & error type of thing. Dr. Lizbeth Pond sent in tizanidine (zanaflex). First try at bedtime, lower dose. If it helps, but makes you too sleepy, then only use at night. If it helps and doesn't cause too much sleepiness, than you can try it during the day. If it it does not help at all, please let us know. Good idea is to at least use it every night before bed for 4-5 nights at minimum to help your muscles relax while you don't need to be moving around. \"    Thanks. Orders Placed This Encounter    tiZANidine (ZANAFLEX) 2 mg tablet     Sig: Take 1-2 Tabs by mouth three (3) times daily as needed (muscle spasms).      Dispense:  60 Tab     Refill:  1

## 2019-12-06 NOTE — LETTER
NOTIFICATION RETURN TO WORK / SCHOOL 
 
12/6/2019 2:38 PM 
 
Ms. Kate Herzog [address] [facility] [address] To Whom It May Concern: 
 
Kate Herzog is currently under the care of [address] [place] [address]. She was seen on 12/2/2019 and is under my care re: low back pain. If there are questions or concerns please have the patient contact our office. Sincerely, Brynn Larios MD

## 2019-12-06 NOTE — TELEPHONE ENCOUNTER
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 She is also requesting a work letter stating that she was seen in the office on 12/2/19 and is current still under your care for her back pain.

## 2019-12-06 NOTE — TELEPHONE ENCOUNTER
Patient called stating that she was told to let Frannie Kaba know if she needed a muscle relaxer, and the patient states that she needs them. Please advise.

## 2019-12-09 ENCOUNTER — TELEPHONE (OUTPATIENT)
Dept: FAMILY MEDICINE CLINIC | Age: 59
End: 2019-12-09

## 2019-12-09 DIAGNOSIS — D25.9 UTERINE LEIOMYOMA, UNSPECIFIED LOCATION: Primary | ICD-10-CM

## 2019-12-09 NOTE — TELEPHONE ENCOUNTER
L-spine XR (12/6/2019)  FINDINGS:    VERTEBRAE AND ALIGNMENT: No evidence for fracture. Slight retrolisthesis of L5 on S1 measuring 2 mm. DISC SPACES: Mild narrowing of L4-L5 and L5-S1 disc. Marginal endplate osteophytes. PARASPINOUS SOFT TISSUES: Unremarkable.     ADDITIONAL: Large calcified fibroadenomas project at the pelvis      Orders Placed This Encounter    REFERRAL TO OBSTETRICS AND GYNECOLOGY     Referral Priority:   Routine     Referral Type:   Consultation     Referral Reason:   Specialty Services Required     Referred to Provider:   Katherine Reynoso MD     Number of Visits Requested:   1

## 2019-12-09 NOTE — TELEPHONE ENCOUNTER
Patient had to go to ER on 12/6/19 due to being in so much pain. They did do an xray and found out patient has Large calcified fibroadenomas project at the pelvis. She would like a referral for a GYN that you trust.     Please advise.

## 2019-12-30 DIAGNOSIS — M53.3 PAIN OF LEFT SACROILIAC JOINT: ICD-10-CM

## 2019-12-31 RX ORDER — NAPROXEN 500 MG/1
TABLET ORAL
Qty: 60 TAB | Refills: 0 | Status: SHIPPED | OUTPATIENT
Start: 2019-12-31 | End: 2020-01-30

## 2020-01-29 DIAGNOSIS — M53.3 PAIN OF LEFT SACROILIAC JOINT: ICD-10-CM

## 2020-01-30 RX ORDER — NAPROXEN 500 MG/1
TABLET ORAL
Qty: 60 TAB | Refills: 1 | Status: SHIPPED | OUTPATIENT
Start: 2020-01-30 | End: 2021-05-10

## 2020-10-06 ENCOUNTER — CLINICAL SUPPORT (OUTPATIENT)
Dept: FAMILY MEDICINE CLINIC | Age: 60
End: 2020-10-06
Payer: COMMERCIAL

## 2020-10-06 VITALS — TEMPERATURE: 97.3 F

## 2020-10-06 DIAGNOSIS — Z23 NEEDS FLU SHOT: Primary | ICD-10-CM

## 2020-10-06 PROCEDURE — 90686 IIV4 VACC NO PRSV 0.5 ML IM: CPT | Performed by: NURSE PRACTITIONER

## 2020-10-06 PROCEDURE — 90471 IMMUNIZATION ADMIN: CPT | Performed by: NURSE PRACTITIONER

## 2020-12-15 ENCOUNTER — TELEPHONE (OUTPATIENT)
Dept: FAMILY MEDICINE CLINIC | Age: 60
End: 2020-12-15

## 2020-12-15 ENCOUNTER — OFFICE VISIT (OUTPATIENT)
Dept: FAMILY MEDICINE CLINIC | Age: 60
End: 2020-12-15
Payer: COMMERCIAL

## 2020-12-15 VITALS
TEMPERATURE: 97.4 F | WEIGHT: 228.4 LBS | SYSTOLIC BLOOD PRESSURE: 134 MMHG | HEIGHT: 66 IN | DIASTOLIC BLOOD PRESSURE: 83 MMHG | RESPIRATION RATE: 14 BRPM | BODY MASS INDEX: 36.71 KG/M2 | OXYGEN SATURATION: 95 % | HEART RATE: 98 BPM

## 2020-12-15 DIAGNOSIS — Z82.49 FAMILY HISTORY OF ISCHEMIC HEART DISEASE (IHD): ICD-10-CM

## 2020-12-15 DIAGNOSIS — E78.5 HYPERLIPIDEMIA, UNSPECIFIED HYPERLIPIDEMIA TYPE: ICD-10-CM

## 2020-12-15 DIAGNOSIS — R07.9 CHEST PAIN, UNSPECIFIED TYPE: Primary | ICD-10-CM

## 2020-12-15 PROCEDURE — 99213 OFFICE O/P EST LOW 20 MIN: CPT | Performed by: NURSE PRACTITIONER

## 2020-12-15 PROCEDURE — 93000 ELECTROCARDIOGRAM COMPLETE: CPT | Performed by: NURSE PRACTITIONER

## 2020-12-15 NOTE — TELEPHONE ENCOUNTER
Called patient to get more information per [facility] NP. Patient states last night she woke up to a neck cramp w/ a pain that radiated down into left arm along with a squeezing in chest that lasted 1 min or less. Patient no longer has any of those symptoms however just wants to follow up in regards to this.

## 2020-12-15 NOTE — PROGRESS NOTES
Tracey Herzog is a 63 y.o. female (: 1960) presenting to address:    Chief Complaint   Patient presents with    Chest Pain    Neck Pain       Vitals:    12/15/20 1412   BP: 134/83   Pulse: 98   Resp: 14   Temp: 97.4 °F (36.3 °C)   TempSrc: Temporal   SpO2: 95%   Weight: 228 lb 6.4 oz (103.6 kg)   Height: 5' 6\" (1.676 m)       Is someone accompanying this pt? NO    Is the patient using any DME equipment during OV? NO    Hearing/Vision:   No exam data present    Learning Assessment:     Learning Assessment 11/3/2015   PRIMARY LEARNER Patient   HIGHEST LEVEL OF EDUCATION - PRIMARY LEARNER  > 4 YEARS OF COLLEGE   BARRIERS PRIMARY LEARNER NONE   PRIMARY LANGUAGE ENGLISH   LEARNER PREFERENCE PRIMARY DEMONSTRATION   ANSWERED BY Patient   RELATIONSHIP SELF     Depression Screening:     3 most recent PHQ Screens 12/15/2020   Little interest or pleasure in doing things Not at all   Feeling down, depressed, irritable, or hopeless Not at all   Total Score PHQ 2 0     Fall Risk Assessment:   No flowsheet data found. Coordination of Care Questionaire:   1. Have you been to the ER, urgent care clinic since your last visit? Hospitalized since your last visit? YES patrice    2. Have you seen or consulted any other health care providers outside of the [address] [place] [address] since your last visit? Include any pap smears or colon screening. NO    Advanced Directive:   1. Do you have an Advanced Directive? NO    2. Would you like information on Advanced Directives?  NO

## 2020-12-15 NOTE — PROGRESS NOTES
SHERYL Schilling is a 63y.o. year old female who presents today after having an episode of left sided neck pain (X5 mins) and then pain started going down left arm (lasted a minute), felt like a tightening of muscles then felt a squeezing on the left side of her chest which lasted for a few seconds. Felt fine, went to bed, and was woken up by the same type of chest pain in the middle of the night and lasted for about a minute before it subsided. Had to grab her 's arm for a minute. Now has a dull aching feeling in her chest but it is getting better. Otherwise feeling OK today. History reviewed. No pertinent past medical history. Past Surgical History:   Procedure Laterality Date    HX GYN  2007    fibroid embolization    HX TUBAL LIGATION         Social History     Tobacco Use    Smoking status: Former Smoker     Packs/day: 0.10     Last attempt to quit: 3/7/1982     Years since quittin.8    Smokeless tobacco: Never Used   Substance Use Topics    Alcohol use: Yes     Alcohol/week: 0.8 standard drinks     Types: 1 Standard drinks or equivalent per week    Drug use: No         Current Outpatient Medications:     Vit D3 & K-Berberine-Hops 443-555- unit-mcg-mg-mg tab, Take  by mouth., Disp: , Rfl:     BIOTIN PO, Take  by mouth., Disp: , Rfl:     COLLAGEN, by Does Not Apply route., Disp: , Rfl:     multivitamin (ONE A DAY) tablet, Take 1 Tab by mouth daily. , Disp: , Rfl:     naproxen (NAPROSYN) 500 mg tablet, TAKE 1 TABLET BY MOUTH TWICE DAILY WITH MEALS, Disp: 60 Tab, Rfl: 1    tiZANidine (ZANAFLEX) 2 mg tablet, Take 1-2 Tabs by mouth three (3) times daily as needed (muscle spasms). , Disp: 60 Tab, Rfl: 1    docosahexanoic acid/epa (FISH OIL PO), Take 1 Cap by mouth daily. , Disp: , Rfl:      No Known Allergies     Review of Systems   Constitutional: Negative for chills, diaphoresis, fever and malaise/fatigue.    Respiratory: Negative for cough, hemoptysis, sputum production, shortness of breath and wheezing. Cardiovascular: Negative for palpitations, orthopnea and leg swelling. Gastrointestinal: Negative for abdominal pain, constipation, diarrhea, nausea and vomiting. Neurological: Negative for dizziness and headaches. PE  /83 (BP 1 Location: Left arm, BP Patient Position: Sitting)   Pulse 98   Temp 97.4 °F (36.3 °C) (Temporal)   Resp 14   Ht 5' 6\" (1.676 m)   Wt 228 lb 6.4 oz (103.6 kg)   SpO2 95%   BMI 36.86 kg/m²     Physical Exam  Vitals signs reviewed. Constitutional:       General: She is not in acute distress. Appearance: Normal appearance. HENT:      Head: Normocephalic and atraumatic. Neck:      Vascular: No carotid bruit. Cardiovascular:      Rate and Rhythm: Normal rate and regular rhythm. Heart sounds: S1 normal and S2 normal. No murmur. No friction rub. No gallop. No S3 or S4 sounds. Pulmonary:      Effort: Pulmonary effort is normal.      Breath sounds: Normal breath sounds. No wheezing, rhonchi or rales. Musculoskeletal:      Right lower leg: No edema. Left lower leg: No edema. Skin:     General: Skin is warm and dry. Capillary Refill: Capillary refill takes less than 2 seconds. Neurological:      General: No focal deficit present. Mental Status: She is alert and oriented to person, place, and time. Assessment/Plan  1.  Chest pain  EKG NSR, unchanged from previous EKG in 2015  Echo  Baby aspirin 81mg PO every day  If symptoms return and/or persist advised to seek emergent treatment

## 2021-05-10 ENCOUNTER — OFFICE VISIT (OUTPATIENT)
Dept: FAMILY MEDICINE CLINIC | Age: 61
End: 2021-05-10
Payer: COMMERCIAL

## 2021-05-10 ENCOUNTER — HOSPITAL ENCOUNTER (OUTPATIENT)
Dept: LAB | Age: 61
Discharge: HOME | End: 2021-05-10
Payer: COMMERCIAL

## 2021-05-10 VITALS
OXYGEN SATURATION: 98 % | DIASTOLIC BLOOD PRESSURE: 75 MMHG | HEART RATE: 60 BPM | SYSTOLIC BLOOD PRESSURE: 114 MMHG | RESPIRATION RATE: 16 BRPM | TEMPERATURE: 97.2 F | BODY MASS INDEX: 34.81 KG/M2 | HEIGHT: 66 IN | WEIGHT: 216.6 LBS

## 2021-05-10 DIAGNOSIS — R35.0 FREQUENCY OF URINATION: Primary | ICD-10-CM

## 2021-05-10 DIAGNOSIS — R20.2 NUMBNESS AND TINGLING IN LEFT HAND: ICD-10-CM

## 2021-05-10 DIAGNOSIS — R35.0 FREQUENCY OF URINATION: ICD-10-CM

## 2021-05-10 DIAGNOSIS — R20.0 NUMBNESS AND TINGLING IN LEFT HAND: ICD-10-CM

## 2021-05-10 LAB
BILIRUB UR QL STRIP: NEGATIVE
GLUCOSE UR-MCNC: NEGATIVE MG/DL
KETONES P FAST UR STRIP-MCNC: NEGATIVE MG/DL
PH UR STRIP: 7.5 [PH] (ref 4.6–8)
PROT UR QL STRIP: NEGATIVE
SP GR UR STRIP: 1.02 (ref 1–1.03)
UA UROBILINOGEN AMB POC: NORMAL (ref 0.2–1)
URINALYSIS CLARITY POC: CLEAR
URINALYSIS COLOR POC: YELLOW
URINE BLOOD POC: NEGATIVE
URINE LEUKOCYTES POC: NORMAL
URINE NITRITES POC: NEGATIVE

## 2021-05-10 PROCEDURE — 87086 URINE CULTURE/COLONY COUNT: CPT

## 2021-05-10 PROCEDURE — 81003 URINALYSIS AUTO W/O SCOPE: CPT | Performed by: LEGAL MEDICINE

## 2021-05-10 PROCEDURE — 99213 OFFICE O/P EST LOW 20 MIN: CPT | Performed by: LEGAL MEDICINE

## 2021-05-10 NOTE — LETTER
NOTIFICATION RETURN TO WORK / SCHOOL 
 
5/10/2021 11:16 AM 
 
Ms. Kate Herzog [address] [facility] [id number] To Whom It May Concern: 
 
Kate Herzog is currently under the care of [address] [place] [address]. She will return to work/school on: 5/11/2021 If there are questions or concerns please have the patient contact our office. Sincerely, Rita Simmons MD

## 2021-05-10 NOTE — PROGRESS NOTES
Tejal Wright MelaniaWright     Chief Complaint   Patient presents with    Urinary Burning    Urinary Frequency     Vitals:    05/10/21 1040   BP: 114/75   Pulse: 60   Resp: 16   Temp: 97.2 °F (36.2 °C)   TempSrc: Temporal   SpO2: 98%   Weight: 216 lb 9.6 oz (98.2 kg)   Height: 5' 6\" (1.676 m)         HPI:Patient is here for abnormal feeling in her genital area ,and when she urinate she feel tingling sensation in her hand !! It has been for 5 days   She has urine urgency and frequency   She has no vaginal discharge   sexually active with her  , last time was 5 days ago     Patient denies pelvic pain, no vaginal discharge, no odor    She had declined vaginal and pelvic examination    No past medical history on file. Past Surgical History:   Procedure Laterality Date    HX GYN  2007    fibroid embolization    HX TUBAL LIGATION       Social History     Tobacco Use    Smoking status: Former Smoker     Packs/day: 0.10     Quit date: 3/7/1982     Years since quittin.2    Smokeless tobacco: Never Used   Substance Use Topics    Alcohol use: Yes     Alcohol/week: 0.8 standard drinks     Types: 1 Standard drinks or equivalent per week       Family History   Problem Relation Age of Onset    Asthma Mother     Diabetes Mother     Diabetes Father     Hypertension Father     Heart Disease Maternal Grandfather 80        MI    Stroke Paternal Grandmother 68    Cancer Paternal Grandfather         Lung cancer       Review of Systems   Constitutional: Negative for chills, fever, malaise/fatigue and weight loss. HENT: Negative for congestion, ear discharge, ear pain, hearing loss, nosebleeds, sinus pain and sore throat. Eyes: Negative for blurred vision, double vision and discharge. Respiratory: Negative for cough. Cardiovascular: Negative for chest pain, palpitations, claudication and leg swelling. Gastrointestinal: Negative for abdominal pain, constipation, diarrhea, nausea and vomiting. Genitourinary: Negative for dysuria, frequency and urgency. Musculoskeletal: Negative for myalgias. Skin: Negative for itching and rash. Neurological: Positive for tingling. Negative for dizziness, sensory change, speech change, focal weakness, weakness and headaches. Physical Exam  Vitals signs and nursing note reviewed. Constitutional:       General: She is not in acute distress. Appearance: She is well-developed. She is not diaphoretic. HENT:      Head: Normocephalic and atraumatic. Eyes:      General: No scleral icterus. Right eye: No discharge. Left eye: No discharge. Conjunctiva/sclera: Conjunctivae normal.   Neck:      Thyroid: No thyromegaly. Cardiovascular:      Rate and Rhythm: Normal rate and regular rhythm. Heart sounds: Normal heart sounds. Pulmonary:      Effort: Pulmonary effort is normal. No respiratory distress. Breath sounds: Normal breath sounds. No wheezing or rales. Chest:      Chest wall: No tenderness. Abdominal:      General: There is no distension. Palpations: Abdomen is soft. Tenderness: There is no abdominal tenderness. There is no rebound. Musculoskeletal: Normal range of motion. General: No tenderness or deformity. Lymphadenopathy:      Cervical: No cervical adenopathy. Skin:     General: Skin is warm and dry. Coloration: Skin is not pale. Findings: No erythema or rash. Neurological:      Mental Status: She is alert and oriented to person, place, and time. Cranial Nerves: No cranial nerve deficit. Coordination: Coordination normal.   Psychiatric:         Behavior: Behavior normal.         Thought Content: Thought content normal.         Judgment: Judgment normal.          Assessment and plan     Plan of care has been discussed with the patient, he agrees to the plan and verbalized understanding.   All his questions were answered  More than 50% of the time spent in this visit was counseling the patient about  illness and treatment options         1. Frequency of urination  Positive for leukocyte await for cultures  - AMB POC URINALYSIS DIP STICK AUTO W/O MICRO  - CULTURE, URINE; Future    2. Numbness and tingling in left hand  Patient has no neurological findings on exam, sensation is intact      Current Outpatient Medications   Medication Sig Dispense Refill    Vit D3 & K-Berberine-Hops 055-897- unit-mcg-mg-mg tab Take  by mouth.  multivitamin (ONE A DAY) tablet Take 1 Tab by mouth daily.       CYANOCOBALAMIN, VITAMIN B-12, cyanocobalamin (vitamin B-12)         Patient Active Problem List    Diagnosis Date Noted    Severe obesity ([address] 10/08/2019    Eczema 10/08/2019    Menopausal state 10/08/2019    Routine health maintenance 11/05/2015    Postmenopausal state 11/04/2015    Obesity 11/03/2015    Varicose veins 11/03/2015    Vitamin D deficiency 06/12/2015    Back pain 02/14/2015    Hepatitis B core antibody positive 03/14/2013     Results for orders placed or performed in visit on 05/10/21   AMB POC URINALYSIS DIP STICK AUTO W/O MICRO   Result Value Ref Range    Color (UA POC) Yellow     Clarity (UA POC) Clear     Glucose (UA POC) Negative Negative    Bilirubin (UA POC) Negative Negative    Ketones (UA POC) Negative Negative    Specific gravity (UA POC) 1.020 1.001 - 1.035    Blood (UA POC) Negative Negative    pH (UA POC) 7.5 4.6 - 8.0    Protein (UA POC) Negative Negative    Urobilinogen (UA POC) 0.2 mg/dL 0.2 - 1    Nitrites (UA POC) Negative Negative    Leukocyte esterase (UA POC) 1+ Negative     Office Visit on 05/10/2021   Component Date Value Ref Range Status    Color (UA POC) 05/10/2021 Yellow   Final    Clarity (UA POC) 05/10/2021 Clear   Final    Glucose (UA POC) 05/10/2021 Negative  Negative Final    Bilirubin (UA POC) 05/10/2021 Negative  Negative Final    Ketones (UA POC) 05/10/2021 Negative  Negative Final    Specific gravity (UA POC) 05/10/2021 1.020  1.001 - 1.035 Final    Blood (UA POC) 05/10/2021 Negative  Negative Final    pH (UA POC) 05/10/2021 7.5  4.6 - 8.0 Final    Protein (UA POC) 05/10/2021 Negative  Negative Final    Urobilinogen (UA POC) 05/10/2021 0.2 mg/dL  0.2 - 1 Final    Nitrites (UA POC) 05/10/2021 Negative  Negative Final    Leukocyte esterase (UA POC) 05/10/2021 1+  Negative Final          Follow-up and Dispositions    · Return if symptoms worsen or fail to improve  follow up with PCP, for as per results.

## 2021-05-10 NOTE — PROGRESS NOTES
Anne-Marie VelázquezShermanAdriana is a 63 y.o. female (: 1960) presenting to address:    Chief Complaint   Patient presents with    Urinary Burning       Vitals:    05/10/21 1040   BP: 114/75   Pulse: 60   Resp: 16   Temp: 97.2 °F (36.2 °C)   TempSrc: Temporal   SpO2: 98%   Weight: 216 lb 9.6 oz (98.2 kg)   Height: 5' 6\" (1.676 m)       Is someone accompanying this pt? NO    Is the patient using any DME equipment during OV? NO    Hearing/Vision:   No exam data present    Learning Assessment:     Learning Assessment 11/3/2015   PRIMARY LEARNER Patient   HIGHEST LEVEL OF EDUCATION - PRIMARY LEARNER  > 4 YEARS OF COLLEGE   BARRIERS PRIMARY LEARNER NONE   PRIMARY LANGUAGE ENGLISH   LEARNER PREFERENCE PRIMARY DEMONSTRATION   ANSWERED BY Patient   RELATIONSHIP SELF     Depression Screening:     3 most recent PHQ Screens 5/10/2021   Little interest or pleasure in doing things Not at all   Feeling down, depressed, irritable, or hopeless Not at all   Total Score PHQ 2 0     Fall Risk Assessment:   No flowsheet data found. Coordination of Care Questionaire:   1. Have you been to the ER, urgent care clinic since your last visit? Hospitalized since your last visit? YES [facility] for stitches on eyebrow     2. Have you seen or consulted any other health care providers outside of the [address] [place] [address] since your last visit? Include any pap smears or colon screening. NO    Advanced Directive:   1. Do you have an Advanced Directive? NO    2. Would you like information on Advanced Directives?  NO

## 2021-05-12 LAB
BACTERIA SPEC CULT: NORMAL
CC UR VC: NORMAL
SERVICE CMNT-IMP: NORMAL

## 2021-12-02 ENCOUNTER — OFFICE VISIT (OUTPATIENT)
Dept: FAMILY MEDICINE CLINIC | Age: 61
End: 2021-12-02
Payer: COMMERCIAL

## 2021-12-02 VITALS
WEIGHT: 231 LBS | OXYGEN SATURATION: 98 % | RESPIRATION RATE: 16 BRPM | HEIGHT: 66 IN | SYSTOLIC BLOOD PRESSURE: 122 MMHG | TEMPERATURE: 97.2 F | DIASTOLIC BLOOD PRESSURE: 78 MMHG | HEART RATE: 77 BPM | BODY MASS INDEX: 37.12 KG/M2

## 2021-12-02 DIAGNOSIS — Z12.31 ENCOUNTER FOR SCREENING MAMMOGRAM FOR MALIGNANT NEOPLASM OF BREAST: ICD-10-CM

## 2021-12-02 DIAGNOSIS — E66.01 SEVERE OBESITY (HCC): Primary | ICD-10-CM

## 2021-12-02 PROCEDURE — 99213 OFFICE O/P EST LOW 20 MIN: CPT | Performed by: NURSE PRACTITIONER

## 2021-12-02 NOTE — PROGRESS NOTES
Vasyl Herzog is a 63 y.o. female (: 1960) presenting to address:    Chief Complaint   Patient presents with   [address]     needs referral for colonoscopy       Vitals:    21 1041   BP: 122/78   Pulse: 77   Resp: 16   Temp: 97.2 °F (36.2 °C)   TempSrc: Temporal   SpO2: 98%   Weight: 231 lb (104.8 kg)   Height: 5' 6\" (1.676 m)   PainSc:   0 - No pain       Hearing/Vision:   No exam data present    Learning Assessment:     Learning Assessment 11/3/2015   PRIMARY LEARNER Patient   HIGHEST LEVEL OF EDUCATION - PRIMARY LEARNER  > 4 YEARS OF COLLEGE   BARRIERS PRIMARY LEARNER NONE   PRIMARY LANGUAGE ENGLISH   LEARNER PREFERENCE PRIMARY DEMONSTRATION   ANSWERED BY Patient   RELATIONSHIP SELF     Depression Screening:     3 most recent PHQ Screens 2021   Little interest or pleasure in doing things Not at all   Feeling down, depressed, irritable, or hopeless Not at all   Total Score PHQ 2 0     Fall Risk Assessment:   No flowsheet data found. Abuse Screening:     Abuse Screening Questionnaire 2021   Do you ever feel afraid of your partner? N   Are you in a relationship with someone who physically or mentally threatens you? N   Is it safe for you to go home? Y     ADL Assessment:   No flowsheet data found. Coordination of Care Questionaire:   1. \"Have you been to the ER, urgent care clinic since your last visit? Hospitalized since your last visit? \" No    2. \"Have you seen or consulted any other health care providers outside of the [address] [place] [address] since your last visit? \" Yes Where: Dr. Kathy Fernandez Practitioner  Reason for visit: establish care     3. For patients aged 39-70: Has the patient had a colonoscopy? No     If the patient is female:    4. For patients aged 41-77: Has the patient had a mammogram within the past 2 years? Yes, HM satisfied with blue hyperlink    5. For patients aged 21-65: Has the patient had a pap smear? Yes, but HM not satisfied.  Rooming MA/LPN to request most recent results [facility]    Advanced Directive:   1. Do you have an Advanced Directive? NO    2. Would you like information on Advanced Directives?  NO

## 2021-12-02 NOTE — PROGRESS NOTES
HPI  Kathy Jackson is a 63y.o. year old female who presents today to [facility] care. She had been seeing Dr. Pako Abel and once she left she went and saw another PCP outside of this office recently for a physical and blood work. States that some of her numbers looked high but she was not fasting when her blood was drawn so she would like those rechecked. Last mammogram:   Last colonoscopy:  (10 year FU)  Last pap:       History reviewed. No pertinent past medical history. Past Surgical History:   Procedure Laterality Date    HX GYN  2007    fibroid embolization    HX TUBAL LIGATION         Social History     Tobacco Use    Smoking status: Former Smoker     Packs/day: 0.10     Quit date: 3/7/1982     Years since quittin.7    Smokeless tobacco: Never Used   Substance Use Topics    Alcohol use: Yes     Alcohol/week: 0.8 standard drinks     Types: 1 Standard drinks or equivalent per week    Drug use: No         Current Outpatient Medications:     CYANOCOBALAMIN, VITAMIN B-12,, cyanocobalamin (vitamin B-12), Disp: , Rfl:     Vit D3 & K-Berberine-Hops 688-922- unit-mcg-mg-mg tab, Take  by mouth., Disp: , Rfl:     multivitamin (ONE A DAY) tablet, Take 1 Tab by mouth daily. , Disp: , Rfl:      No Known Allergies     Review of Systems   Constitutional: Negative for chills, fever and malaise/fatigue. Respiratory: Negative for cough and shortness of breath. Cardiovascular: Negative for chest pain, palpitations and leg swelling. Gastrointestinal: Negative for abdominal pain, diarrhea, nausea and vomiting. Neurological: Negative for dizziness and headaches. Psychiatric/Behavioral: Negative for depression, hallucinations, substance abuse and suicidal ideas. The patient does not have insomnia.           PE  /78 (BP 1 Location: Left upper arm, BP Patient Position: Sitting, BP Cuff Size: Large adult)   Pulse 77   Temp 97.2 °F (36.2 °C) (Temporal)   Resp 16   Ht 5' 6\" (1.676 m)   Wt 231 lb (104.8 kg)   SpO2 98%   BMI 37.28 kg/m²     Physical Exam  Vitals reviewed. Constitutional:       General: She is not in acute distress. Appearance: Normal appearance. HENT:      Head: Normocephalic and atraumatic. Cardiovascular:      Rate and Rhythm: Normal rate and regular rhythm. Heart sounds: S1 normal and S2 normal. No murmur heard. No friction rub. No gallop. No S3 or S4 sounds. Pulmonary:      Effort: Pulmonary effort is normal.      Breath sounds: Normal breath sounds. No wheezing, rhonchi or rales. Musculoskeletal:      Right lower leg: No edema. Left lower leg: No edema. Skin:     General: Skin is warm and dry. Capillary Refill: Capillary refill takes less than 2 seconds. Neurological:      Mental Status: She is alert and oriented to person, place, and time. Assessment/Plan  1.  Preventive care  Get recent labs and recheck if necessary  Mammogram ordered, otherwise UTD on HCM    2. Obesity  Diet, exercise, weight loss discussed

## 2022-03-19 PROBLEM — E66.01 SEVERE OBESITY (HCC): Status: ACTIVE | Noted: 2019-10-08

## 2022-03-19 PROBLEM — L30.9 ECZEMA: Status: ACTIVE | Noted: 2019-10-08

## 2022-03-19 PROBLEM — N95.1 MENOPAUSAL STATE: Status: ACTIVE | Noted: 2019-10-08

## 2022-04-04 ENCOUNTER — OFFICE VISIT (OUTPATIENT)
Dept: FAMILY MEDICINE CLINIC | Age: 62
End: 2022-04-04
Payer: COMMERCIAL

## 2022-04-04 VITALS
BODY MASS INDEX: 37.19 KG/M2 | SYSTOLIC BLOOD PRESSURE: 133 MMHG | TEMPERATURE: 97.7 F | OXYGEN SATURATION: 95 % | HEART RATE: 73 BPM | HEIGHT: 66 IN | RESPIRATION RATE: 16 BRPM | WEIGHT: 231.4 LBS | DIASTOLIC BLOOD PRESSURE: 78 MMHG

## 2022-04-04 DIAGNOSIS — R20.0 FINGER NUMBNESS: ICD-10-CM

## 2022-04-04 DIAGNOSIS — G56.02 CARPAL TUNNEL SYNDROME ON LEFT: Primary | ICD-10-CM

## 2022-04-04 DIAGNOSIS — Z00.00 ROUTINE ADULT HEALTH MAINTENANCE: ICD-10-CM

## 2022-04-04 DIAGNOSIS — E53.8 B12 DEFICIENCY: ICD-10-CM

## 2022-04-04 DIAGNOSIS — N95.1 MENOPAUSAL STATE: ICD-10-CM

## 2022-04-04 DIAGNOSIS — E55.9 VITAMIN D DEFICIENCY: ICD-10-CM

## 2022-04-04 DIAGNOSIS — R06.2 WHEEZING: ICD-10-CM

## 2022-04-04 DIAGNOSIS — E78.5 HYPERLIPIDEMIA, UNSPECIFIED HYPERLIPIDEMIA TYPE: ICD-10-CM

## 2022-04-04 PROCEDURE — 99214 OFFICE O/P EST MOD 30 MIN: CPT | Performed by: STUDENT IN AN ORGANIZED HEALTH CARE EDUCATION/TRAINING PROGRAM

## 2022-04-04 NOTE — PATIENT INSTRUCTIONS
Topical Options   Can purchase Aspercreme or Volteran gel 1% (also known as Diclofenac) at the pharmacy without a prescription. It is the same kind of medication as Ibuprofen (NSAID) which works well for inflammation without the side effects that come with oral version of the medication. It comes in a tube, usually 100g and can be used as needed for muscular or joint pain.  Can also try patches that contain antiinflammatory medicine, such as Salonpas patches that are made of thin, stretchable fabric and contain three active ingredients that work together as a topical pain treatment: camphor )3.1%(?, menthol )6%(???????????? and methyl salicylate (13%.) A single patch is reportedly effective for 8 to 12 hours.

## 2022-04-04 NOTE — PROGRESS NOTES
Note to patient:  The purpose of this note is to communicate optimally with the other physicians / APCs involved in your care. It is written using standard medical terminology. If you have questions regarding the details of the note, please contact my office to schedule an appointment to address your questions. John Chaves  Primary Care Office Visit - Problem-Oriented    : 1960   Kate Oconnor is a 57 y.o. female presenting for  Chief Complaint   Patient presents with    Finger Pain     & numbness in right pointer finger x 1 month            Assessment/Plan:       ICD-10-CM ICD-9-CM   1. Carpal tunnel syndrome on left  G56.02 354.0   2. Hyperlipidemia, unspecified hyperlipidemia type  E78.5 272.4   3. Wheezing  R06.2 786.07   4. BMI 37.0-37.9, adult  Z68.37 V85.37   5. Vitamin D deficiency  E55.9 268.9   6. Menopausal state  N95.1 627.2   7. Routine adult health maintenance  Z00.00 V70.0   8. B12 deficiency  E53.8 266.2   9. Finger numbness  R20.0 782.0     #Left index numbness  Suspect secondary to #Early carpal tunnel L side  Reviewed diagnosis and conservative management including use of wrist splint at minimum when working with hands and when sleeping. Provided handout describing optimal use of OTC agents as well as exercises that can help     Last labs within last year with Dr. Tamiko Sanchez ok\" - unsure of what was ordered and specialty, online says ? internal medicine  Will update annual labs and review at [facility] visit    #HM  Reviewed current vaccination recommendations and would offer additional information if interested. Cervical CA screening - Due for pap  Breast CA screening - UpToDate  Colon CA screening -  UpToDate  Osteoporosis screening - Would encourage to consider starting at 66y/o.      Orders Placed This Encounter    HEMOGLOBIN A1C WITH EAG     Standing Status:   Future     Standing Expiration Date:   2023    CBC WITH AUTOMATED DIFF     Standing Status:   Future     Standing Expiration Date:   9/5/9220    METABOLIC PANEL, COMPREHENSIVE     Standing Status:   Future     Standing Expiration Date:   4/4/2023    T4, FREE     Standing Status:   Future     Standing Expiration Date:   4/4/2023    LIPID PANEL     Standing Status:   Future     Standing Expiration Date:   4/4/2023    TSH 3RD GENERATION     Standing Status:   Future     Standing Expiration Date:   4/4/2023    VITAMIN D, 25 HYDROXY     Standing Status:   Future     Standing Expiration Date:   4/4/2023    VITAMIN B12     Standing Status:   Future     Standing Expiration Date:   4/5/2023    OTHER     Sig: Black seed gummy    OTHER     Sig: Super Beets       Follow-up and Dispositions    · Return for when able to update labs (fasting 8hrs) and schedule CHARITY . Reviewed management plan & instructions with patient, who voiced understanding. Subjective   History:   Kate Lopez Rosenda is a 57 y.o. female presenting to address:  Chief Complaint   Patient presents with    Finger Pain     & numbness in right pointer finger x 1 month     Typing all day   1mo  When reaching, feels pulling in left index  Numbness started ~2weeks  Denies pain, more uncomfortable (4-5/10)  No trauma or fall on hand  Hx of pinched nerve ?low back with L sciatica     Current Outpatient Medications   Medication Sig    OTHER Black seed gummy    OTHER Super Beets    CYANOCOBALAMIN, VITAMIN B-12, cyanocobalamin (vitamin B-12)    multivitamin (ONE A DAY) tablet Take 1 Tab by mouth daily. No current facility-administered medications for this visit.        Review of Systems:     A comprehensive review of systems was negative except for that written in the HPI and A/P         Objective     Physical Assessment:     Visit Vitals  /78 (BP 1 Location: Left upper arm, BP Patient Position: Sitting, BP Cuff Size: Adult)   Pulse 73   Temp 97.7 °F (36.5 °C) (Temporal)   Resp 16   Ht 5' 6\" (1.676 m)   Wt 231 lb 6.4 oz (105 kg)   SpO2 95%   BMI 37.35 kg/m²       Physical Exam  Vitals and nursing note reviewed. Constitutional:       General: She is not in acute distress. Interventions: Face mask in place. Cardiovascular:      Rate and Rhythm: Normal rate and regular rhythm. Pulses: Normal pulses. Pulmonary:      Effort: Pulmonary effort is normal. No respiratory distress. Musculoskeletal:      Right wrist: Normal.      Left wrist: No swelling or bony tenderness. Normal range of motion. Normal pulse. Comments: Reproducible sx with Phalen test - L index   Neurological:      Mental Status: She is alert and oriented to person, place, and time. Gait: Gait normal.   Psychiatric:         Mood and Affect: Mood normal.         Behavior: Behavior normal.         Thought Content: Thought content normal.         Judgment: Judgment normal.                 This document may have been created with the aid of dictation software. Text may contain errors, particularly phonetic errors.       Adalberto Garvey DO  Family Medicine  04/04/2022

## 2022-04-04 NOTE — PROGRESS NOTES
Lili Herzog is a 57 y.o. female (: 1960) presenting to address:    Chief Complaint   Patient presents with    Finger Pain     & numbness in right pointer finger x 1 month       Vitals:    22 0813   BP: 133/78   Pulse: 73   Resp: 16   Temp: 97.7 °F (36.5 °C)   TempSrc: Temporal   SpO2: 95%   Weight: 231 lb 6.4 oz (105 kg)   Height: 5' 6\" (1.676 m)   PainSc:   0 - No pain       Hearing/Vision:   No exam data present    Learning Assessment:     Learning Assessment 11/3/2015   PRIMARY LEARNER Patient   HIGHEST LEVEL OF EDUCATION - PRIMARY LEARNER  > 4 YEARS OF COLLEGE   BARRIERS PRIMARY LEARNER NONE   PRIMARY LANGUAGE ENGLISH   LEARNER PREFERENCE PRIMARY DEMONSTRATION   ANSWERED BY Patient   RELATIONSHIP SELF     Depression Screening:     3 most recent PHQ Screens 2022   Little interest or pleasure in doing things Not at all   Feeling down, depressed, irritable, or hopeless Not at all   Total Score PHQ 2 0     Fall Risk Assessment:     Fall Risk Assessment, last 12 mths 2022   Able to walk? Yes   Fall in past 12 months? 0   Do you feel unsteady? 0   Are you worried about falling 0     Abuse Screening:     Abuse Screening Questionnaire 2022   Do you ever feel afraid of your partner? N   Are you in a relationship with someone who physically or mentally threatens you? N   Is it safe for you to go home? Y     ADL Assessment:   No flowsheet data found. Coordination of Care Questionaire:   1. \"Have you been to the ER, urgent care clinic since your last visit? Hospitalized since your last visit? \" No    2. \"Have you seen or consulted any other health care providers outside of the [address] [place] [address] since your last visit? \" Yes Where: Dr. Juan Ramon Sawyer Reason for visit: Physical     3. For patients aged 39-70: Has the patient had a colonoscopy / FIT/ Cologuard? Yes - no Care Gap present      If the patient is female:    4.  For patients aged 41-77: Has the patient had a mammogram within the past 2 years? Yes - no Care Gap present  See top three    5. For patients aged 21-65: Has the patient had a pap smear? Yes - Care Gap present. Rooming MA/LPN to request most recent results (Ramila Cabello [facility])    Advanced Directive:   1. Do you have an Advanced Directive? NO    2. Would you like information on Advanced Directives?  NO

## 2022-07-06 ENCOUNTER — TELEPHONE (OUTPATIENT)
Dept: FAMILY MEDICINE CLINIC | Age: 62
End: 2022-07-06

## 2023-01-09 DIAGNOSIS — N95.1 MENOPAUSAL STATE: ICD-10-CM

## 2023-01-09 DIAGNOSIS — E78.5 HYPERLIPIDEMIA, UNSPECIFIED HYPERLIPIDEMIA TYPE: ICD-10-CM

## 2023-01-09 DIAGNOSIS — E55.9 VITAMIN D DEFICIENCY: ICD-10-CM

## 2023-01-09 DIAGNOSIS — D72.819 LEUKOPENIA, UNSPECIFIED TYPE: Primary | ICD-10-CM

## 2023-01-09 DIAGNOSIS — Z00.00 ROUTINE ADULT HEALTH MAINTENANCE: ICD-10-CM

## 2023-01-09 DIAGNOSIS — E53.8 B12 DEFICIENCY: ICD-10-CM

## 2023-01-11 ENCOUNTER — HOSPITAL ENCOUNTER (OUTPATIENT)
Dept: LAB | Age: 63
Discharge: HOME | End: 2023-01-11
Payer: COMMERCIAL

## 2023-01-11 ENCOUNTER — APPOINTMENT (OUTPATIENT)
Dept: FAMILY MEDICINE CLINIC | Age: 63
End: 2023-01-11

## 2023-01-11 DIAGNOSIS — Z00.00 ROUTINE ADULT HEALTH MAINTENANCE: ICD-10-CM

## 2023-01-11 DIAGNOSIS — E55.9 VITAMIN D DEFICIENCY: ICD-10-CM

## 2023-01-11 DIAGNOSIS — E78.5 HYPERLIPIDEMIA, UNSPECIFIED HYPERLIPIDEMIA TYPE: ICD-10-CM

## 2023-01-11 DIAGNOSIS — D72.819 LEUKOPENIA, UNSPECIFIED TYPE: ICD-10-CM

## 2023-01-11 DIAGNOSIS — E53.8 B12 DEFICIENCY: ICD-10-CM

## 2023-01-11 DIAGNOSIS — N95.1 MENOPAUSAL STATE: ICD-10-CM

## 2023-01-11 LAB
ALBUMIN SERPL-MCNC: 4 G/DL (ref 3.4–5)
ALBUMIN/GLOB SERPL: 1.2 (ref 0.8–1.7)
ALP SERPL-CCNC: 69 U/L (ref 45–117)
ALT SERPL-CCNC: 28 U/L (ref 13–56)
ANION GAP SERPL CALC-SCNC: 2 MMOL/L (ref 3–18)
AST SERPL-CCNC: 14 U/L (ref 10–38)
BASOPHILS # BLD: 0 K/UL (ref 0–0.1)
BASOPHILS NFR BLD: 0 % (ref 0–2)
BILIRUB SERPL-MCNC: 0.5 MG/DL (ref 0.2–1)
BUN SERPL-MCNC: 13 MG/DL (ref 7–18)
BUN/CREAT SERPL: 14 (ref 12–20)
CALCIUM SERPL-MCNC: 9.6 MG/DL (ref 8.5–10.1)
CHLORIDE SERPL-SCNC: 104 MMOL/L (ref 100–111)
CHOLEST SERPL-MCNC: 227 MG/DL
CO2 SERPL-SCNC: 33 MMOL/L (ref 21–32)
CREAT SERPL-MCNC: 0.93 MG/DL (ref 0.6–1.3)
DIFFERENTIAL METHOD BLD: NORMAL
EOSINOPHIL # BLD: 0.2 K/UL (ref 0–0.4)
EOSINOPHIL NFR BLD: 4 % (ref 0–5)
ERYTHROCYTE [DISTWIDTH] IN BLOOD BY AUTOMATED COUNT: 12 % (ref 11.6–14.5)
EST. AVERAGE GLUCOSE BLD GHB EST-MCNC: 120 MG/DL
FOLATE SERPL-MCNC: 19.8 NG/ML (ref 3.1–17.5)
GLOBULIN SER CALC-MCNC: 3.3 G/DL (ref 2–4)
GLUCOSE SERPL-MCNC: 99 MG/DL (ref 74–99)
HBA1C MFR BLD: 5.8 % (ref 4.2–5.6)
HCT VFR BLD AUTO: 40.7 % (ref 35–45)
HDLC SERPL-MCNC: 38 MG/DL (ref 40–60)
HDLC SERPL: 6 (ref 0–5)
HGB BLD-MCNC: 13.7 G/DL (ref 12–16)
IMM GRANULOCYTES # BLD AUTO: 0 K/UL (ref 0–0.04)
IMM GRANULOCYTES NFR BLD AUTO: 0 % (ref 0–0.5)
LDLC SERPL CALC-MCNC: 147 MG/DL (ref 0–100)
LIPID PROFILE,FLP: ABNORMAL
LYMPHOCYTES # BLD: 2.2 K/UL (ref 0.9–3.6)
LYMPHOCYTES NFR BLD: 45 % (ref 21–52)
MCH RBC QN AUTO: 30.1 PG (ref 24–34)
MCHC RBC AUTO-ENTMCNC: 33.7 G/DL (ref 31–37)
MCV RBC AUTO: 89.5 FL (ref 78–100)
MONOCYTES # BLD: 0.4 K/UL (ref 0.05–1.2)
MONOCYTES NFR BLD: 8 % (ref 3–10)
NEUTS SEG # BLD: 2 K/UL (ref 1.8–8)
NEUTS SEG NFR BLD: 42 % (ref 40–73)
NRBC # BLD: 0 K/UL (ref 0–0.01)
NRBC BLD-RTO: 0 PER 100 WBC
PLATELET # BLD AUTO: 260 K/UL (ref 135–420)
PMV BLD AUTO: 11.4 FL (ref 9.2–11.8)
POTASSIUM SERPL-SCNC: 4.5 MMOL/L (ref 3.5–5.5)
PROT SERPL-MCNC: 7.3 G/DL (ref 6.4–8.2)
RBC # BLD AUTO: 4.55 M/UL (ref 4.2–5.3)
SODIUM SERPL-SCNC: 139 MMOL/L (ref 136–145)
T4 FREE SERPL-MCNC: 1 NG/DL (ref 0.7–1.5)
TRIGL SERPL-MCNC: 210 MG/DL (ref ?–150)
TSH SERPL DL<=0.05 MIU/L-ACNC: 1.42 UIU/ML (ref 0.36–3.74)
VIT B12 SERPL-MCNC: 1087 PG/ML (ref 211–911)
VLDLC SERPL CALC-MCNC: 42 MG/DL
WBC # BLD AUTO: 4.8 K/UL (ref 4.6–13.2)

## 2023-01-11 PROCEDURE — 80053 COMPREHEN METABOLIC PANEL: CPT

## 2023-01-11 PROCEDURE — 83036 HEMOGLOBIN GLYCOSYLATED A1C: CPT

## 2023-01-11 PROCEDURE — 80061 LIPID PANEL: CPT

## 2023-01-11 PROCEDURE — 84439 ASSAY OF FREE THYROXINE: CPT

## 2023-01-11 PROCEDURE — 82607 VITAMIN B-12: CPT

## 2023-01-11 PROCEDURE — 36415 COLL VENOUS BLD VENIPUNCTURE: CPT

## 2023-01-11 PROCEDURE — 85025 COMPLETE CBC W/AUTO DIFF WBC: CPT

## 2023-01-11 PROCEDURE — 84443 ASSAY THYROID STIM HORMONE: CPT

## 2023-01-13 NOTE — PROGRESS NOTES
Will review in detail at follow up:     Future Appointments  1/25/2023  10:00 AM   Gilma Hart, DO      BSMA                BS AMB    A1C newly elevated   The 10-year ASCVD risk score (Anup BOGGS, et al., 2019) is: 9.5%

## 2023-01-25 ENCOUNTER — OFFICE VISIT (OUTPATIENT)
Dept: FAMILY MEDICINE CLINIC | Age: 63
End: 2023-01-25
Payer: COMMERCIAL

## 2023-01-25 VITALS
HEART RATE: 98 BPM | OXYGEN SATURATION: 96 % | DIASTOLIC BLOOD PRESSURE: 75 MMHG | WEIGHT: 231 LBS | TEMPERATURE: 97 F | HEIGHT: 66 IN | BODY MASS INDEX: 37.12 KG/M2 | RESPIRATION RATE: 16 BRPM | SYSTOLIC BLOOD PRESSURE: 112 MMHG

## 2023-01-25 DIAGNOSIS — R73.09 ELEVATED HEMOGLOBIN A1C: ICD-10-CM

## 2023-01-25 DIAGNOSIS — E78.5 DYSLIPIDEMIA, GOAL LDL BELOW 100: ICD-10-CM

## 2023-01-25 DIAGNOSIS — M25.562 CHRONIC PAIN OF LEFT KNEE: ICD-10-CM

## 2023-01-25 DIAGNOSIS — G89.29 CHRONIC PAIN OF LEFT KNEE: ICD-10-CM

## 2023-01-25 DIAGNOSIS — E66.01 SEVERE OBESITY (BMI 35.0-39.9) WITH COMORBIDITY (HCC): ICD-10-CM

## 2023-01-25 DIAGNOSIS — Z00.00 ANNUAL PHYSICAL EXAM: ICD-10-CM

## 2023-01-25 DIAGNOSIS — Z76.89 ESTABLISHING CARE WITH NEW DOCTOR, ENCOUNTER FOR: Primary | ICD-10-CM

## 2023-01-25 DIAGNOSIS — E53.8 B12 DEFICIENCY: ICD-10-CM

## 2023-01-25 DIAGNOSIS — E55.9 VITAMIN D DEFICIENCY: ICD-10-CM

## 2023-01-25 DIAGNOSIS — R20.0 NUMBNESS OF LEG: ICD-10-CM

## 2023-01-25 PROCEDURE — 99214 OFFICE O/P EST MOD 30 MIN: CPT | Performed by: STUDENT IN AN ORGANIZED HEALTH CARE EDUCATION/TRAINING PROGRAM

## 2023-01-25 PROCEDURE — 99396 PREV VISIT EST AGE 40-64: CPT | Performed by: STUDENT IN AN ORGANIZED HEALTH CARE EDUCATION/TRAINING PROGRAM

## 2023-01-25 RX ORDER — ROSUVASTATIN CALCIUM 5 MG/1
5 TABLET, COATED ORAL
Qty: 90 TABLET | Refills: 3 | Status: SHIPPED | OUTPATIENT
Start: 2023-01-25

## 2023-01-25 RX ORDER — LUBRICANT EYE DROPS 3; 10 MG/ML; MG/ML
SOLUTION/ DROPS OPHTHALMIC
COMMUNITY

## 2023-01-25 NOTE — PATIENT INSTRUCTIONS
Lab Results   Component Value Date/Time    LDL, calculated 147 (H) 01/11/2023 09:11 AM     The 10-year ASCVD risk score (Anup BOGGS, et al., 2019) is: 6.2%

## 2023-01-25 NOTE — PROGRESS NOTES
Nic VelázquezShermanAdriana is a 60 y.o. female (: 1960)  Pt is not fasting. Pt is in Room # 3 presenting to address:    No chief complaint on file. There were no vitals filed for this visit. Hearing/Vision:   No results found. Learning Assessment:     Learning Assessment 11/3/2015   PRIMARY LEARNER Patient   HIGHEST LEVEL OF EDUCATION - PRIMARY LEARNER  > 4 YEARS OF COLLEGE   BARRIERS PRIMARY LEARNER NONE   PRIMARY LANGUAGE ENGLISH   LEARNER PREFERENCE PRIMARY DEMONSTRATION   ANSWERED BY Patient   RELATIONSHIP SELF     Depression Screening:     3 most recent PHQ Screens 2023   Little interest or pleasure in doing things Not at all   Feeling down, depressed, irritable, or hopeless Not at all   Total Score PHQ 2 0     Fall Risk Assessment:     Fall Risk Assessment, last 12 mths 2022   Able to walk? Yes   Fall in past 12 months? 0   Do you feel unsteady? 0   Are you worried about falling 0     Abuse Screening:     Abuse Screening Questionnaire 2022   Do you ever feel afraid of your partner? N   Are you in a relationship with someone who physically or mentally threatens you? N   Is it safe for you to go home? Y     ADL Assessment:   No flowsheet data found. Coordination of Care Questionaire:   1. \"Have you been to the ER, urgent care clinic since your last visit? Hospitalized since your last visit? \" No    2. \"Have you seen or consulted any other health care providers outside of the [address] [place] [address] since your last visit? \" No     3. For patients aged 39-70: Has the patient had a colonoscopy / FIT/ Cologuard? No      If the patient is female:    4. For patients aged 41-77: Has the patient had a mammogram within the past 2 years? No  See top three    5. For patients aged 21-65: Has the patient had a pap smear? No    Advanced Directive:   1. Do you have an Advanced Directive? YES    2. Would you like information on Advanced Directives?  NO

## 2023-01-25 NOTE — PROGRESS NOTES
Kate Herzog (: 1960) is a 60 y.o. female, NEW TO PROVIDER, here to establish care for:    ICD-10-CM ICD-9-CM   1. Establishing care with new doctor, encounter for  Z76.89 V65.8   2. Chronic pain of left knee  M25.562 719.46    G89.29 338.29   3. Dyslipidemia, goal LDL below 100  E78.5 272.4   4. Numbness of leg  R20.0 782.0   5. Severe obesity (BMI 35.0-39. 9) with comorbidity ([facility] [address]  E66.01 278.01   6. Vitamin D deficiency  E55.9 268.9   7. B12 deficiency  E53.8 266.2   8. Annual physical exam  Z00.00 V70.0     Assessment   Plan     #Chronic low back pain with #lumbar degenerative disc disease and #Some numbness in thighs   #Leg weakness - \"leg gave out\"  And chronic #L knee pain   Rec review Lumbar X ray - Dec 2019 Mild narrowing of L4-L5 and L5-S1 disc. Marginal endplate osteophytes. Rec review 2011 B/L Knee X ray - Degenerative narrowing of the patellofemoral joint compartment bilaterally. No erosive changes noted. Agreeable to evel knee and hip X ray  in office   Prior steroid injection? Never had; Reviewed could consider trial therapeutic/dx knee steroid injection at follow up. Reviewed if treatment is not effective for >3mo then likely strength of arthritis > strength of treatments available and options to consider surgical evaluation vs medication management. PT? Provided [facility] joint conditioning program to try at home and encouraged to follow up if interested in referral to PT. Reviewed safe and effective dosing of OTC analgesics and provided instructions.    Encouraged to utilize nonpharmacologic treatments such as rest, heat/cold, stretching, and massage to affected area as well    #HLD - Goal LDL < 100  Agreeable to initiate low dose statin for primary prevention    Lab Results   Component Value Date/Time    LDL, calculated 147 (H) 2023 09:11 AM     The 10-year ASCVD risk score (Anup BOGGS, et al., 2019) is: 6.2%    Values used to calculate the score:      Age: 60 years      Sex: Female      Is Non- : Yes      Diabetic: No      Tobacco smoker: No      Systolic Blood Pressure: 523 mmHg      Is BP treated: No      HDL Cholesterol: 38 MG/DL      Total Cholesterol: 227 MG/DL    #Prediabetes  Reviewed diagnosis and recommendations for behavioral/dietary changes to improve. Encouraged to continue once to twice yearly monitoring of A1C. Also discussed consideration of initiating Metformin to help reduce insulin resistance. Lab Results   Component Value Date/Time    Hemoglobin A1c 5.8 (H) 01/11/2023 09:11 AM    Hemoglobin A1c 5.4 10/10/2019 08:39 AM    Hemoglobin A1c 5.7 01/12/2017 10:56 AM           Orders Placed This Encounter    XR KNEE LT MIN 4 V     STANDING AP, lateral, notch & sunrise views please. Standing Status:   Future     Number of Occurrences:   1     Standing Expiration Date:   1/25/2024     Order Specific Question:   Which facility to perform procedure? Answer:   [facility]    XR HIPS BI W OR WO AP PELV     Weight-bearing please     Standing Status:   Future     Number of Occurrences:   1     Standing Expiration Date:   1/25/2024     Order Specific Question:   Which facility to perform procedure? Answer:   [facility]    XR KNEE RT MIN 4 V     STANDING AP, lateral, notch & sunrise views please. Standing Status:   Future     Number of Occurrences:   1     Standing Expiration Date:   1/25/2024     Order Specific Question:   Which facility to perform procedure? Answer:   [facility]    herbal drugs (GAMMA-LINIC-500 PO)     Sig: Take  by mouth.    marina's wort 300 mg tab     Sig: Take  by mouth. tumeric-ging-olive-oreg-capryl 100 mg-150 mg- 50 mg-150 mg cap     Sig: Take  by mouth. rosuvastatin (CRESTOR) 5 mg tablet     Sig: Take 1 Tablet by mouth nightly.  Indications: treatment to prevent a heart attack     Dispense:  90 Tablet     Refill:  3     Return in about 6 months (around 7/25/2023) for 30 min follow up, labs 1 week prior, A1C check.    Subjective   Extensive review of medical history and medications completed to facilitate transfer of care. Current Outpatient Medications   Medication Instructions    herbal drugs (GAMMA-LINIC-500 PO) Oral    multivitamin (ONE A DAY) tablet 1 Tablet, Oral, DAILY    rosuvastatin (CRESTOR) 5 mg, Oral, EVERY BEDTIME    marina's wort 300 mg tab Oral    tumeric-ging-olive-oreg-capryl 100 mg-150 mg- 50 mg-150 mg cap Oral      No Known Allergies   Objective   Visit Vitals  /75 (BP 1 Location: Right arm, BP Patient Position: Sitting)   Pulse 98   Temp 97 °F (36.1 °C) (Temporal)   Resp 16   Ht 5' 6\" (1.676 m)   Wt 231 lb (104.8 kg)   SpO2 96%   BMI 37.28 kg/m²     Physical Exam  Vitals and nursing note reviewed. Constitutional:       General: She is not in acute distress. Cardiovascular:      Rate and Rhythm: Normal rate. Pulses: Normal pulses. Pulmonary:      Effort: Pulmonary effort is normal. No respiratory distress. Neurological:      Mental Status: She is alert and oriented to person, place, and time.       Gait: Gait normal.   Psychiatric:         Mood and Affect: Mood normal.         Behavior: Behavior normal.          Darrell Lamb, DO  Family Medicine  01/25/2023

## 2023-01-27 NOTE — PROGRESS NOTES
Jonathan Herzog (: 1960) is a 60 y.o. female, NEW TO PROVIDER patient, here for ANNUAL PHYSICAL:    Annual physical exam  Z00.00 V70.0     Assessment   Plan   HM  Screening recommendations for health maintenance reviewed         Orders Placed This Encounter    XR KNEE LT MIN 4 V     STANDING AP, lateral, notch & sunrise views please. Standing Status:   Future     Number of Occurrences:   1     Standing Expiration Date:   2024     Order Specific Question:   Which facility to perform procedure? Answer:   [facility]    XR HIPS BI W OR WO AP PELV     Weight-bearing please     Standing Status:   Future     Number of Occurrences:   1     Standing Expiration Date:   2024     Order Specific Question:   Which facility to perform procedure? Answer:   [facility]    XR KNEE RT MIN 4 V     STANDING AP, lateral, notch & sunrise views please. Standing Status:   Future     Number of Occurrences:   1     Standing Expiration Date:   2024     Order Specific Question:   Which facility to perform procedure? Answer:   [facility]    herbal drugs (GAMMA-LINIC-500 PO)     Sig: Take  by mouth.    marina's wort 300 mg tab     Sig: Take  by mouth. tumeric-ging-olive-oreg-capryl 100 mg-150 mg- 50 mg-150 mg cap     Sig: Take  by mouth. rosuvastatin (CRESTOR) 5 mg tablet     Sig: Take 1 Tablet by mouth nightly. Indications: treatment to prevent a heart attack     Dispense:  90 Tablet     Refill:  3     Return in about 6 months (around 2023) for 30 min follow up, labs 1 week prior, A1C check.   Subjective   Last PCP visit: 2022  See other note     Current Outpatient Medications   Medication Instructions    herbal drugs (GAMMA-LINIC-500 PO) Oral    multivitamin (ONE A DAY) tablet 1 Tablet, Oral, DAILY    rosuvastatin (CRESTOR) 5 mg, Oral, EVERY BEDTIME    marina's wort 300 mg tab Oral    tumeric-ging-olive-oreg-capryl 100 mg-150 mg- 50 mg-150 mg cap Oral      Objective   Visit Vitals  /75 (BP 1 Location: Right arm, BP Patient Position: Sitting)   Pulse 98   Temp 97 °F (36.1 °C) (Temporal)   Resp 16   Ht 5' 6\" (1.676 m)   Wt 231 lb (104.8 kg)   SpO2 96%   BMI 37.28 kg/m²     Physical Exam  Vitals and nursing note reviewed. Constitutional:       General: She is not in acute distress. Cardiovascular:      Rate and Rhythm: Normal rate. Pulses: Normal pulses. Pulmonary:      Effort: Pulmonary effort is normal. No respiratory distress. Neurological:      Mental Status: She is alert and oriented to person, place, and time.       Gait: Gait normal.   Psychiatric:         Mood and Affect: Mood normal.         Behavior: Behavior normal.          Jocelin Marin,   Family Medicine  01/25/2023

## 2023-07-18 DIAGNOSIS — R73.03 PREDIABETES: ICD-10-CM

## 2023-07-18 DIAGNOSIS — E78.5 DYSLIPIDEMIA, GOAL LDL BELOW 70: ICD-10-CM

## 2023-07-25 ENCOUNTER — TELEPHONE (OUTPATIENT)
Dept: FAMILY MEDICINE CLINIC | Facility: CLINIC | Age: 63
End: 2023-07-25

## 2023-07-25 NOTE — TELEPHONE ENCOUNTER
----- Message from [address] sent at 7/25/2023 12:47 PM EDT -----  Subject: Message to Provider    QUESTIONS  Information for Provider? patient needs to schedule her lab appointment   ---------------------------------------------------------------------------  --------------  [address]  [phone]; OK to leave message on voicemail  ---------------------------------------------------------------------------  --------------  SCRIPT ANSWERS  Relationship to Patient?  Self

## 2024-01-19 SDOH — HEALTH STABILITY: PHYSICAL HEALTH: ON AVERAGE, HOW MANY MINUTES DO YOU ENGAGE IN EXERCISE AT THIS LEVEL?: 30 MIN

## 2024-01-19 SDOH — HEALTH STABILITY: PHYSICAL HEALTH: ON AVERAGE, HOW MANY DAYS PER WEEK DO YOU ENGAGE IN MODERATE TO STRENUOUS EXERCISE (LIKE A BRISK WALK)?: 2 DAYS

## 2024-01-22 ENCOUNTER — OFFICE VISIT (OUTPATIENT)
Dept: FAMILY MEDICINE CLINIC | Facility: CLINIC | Age: 64
End: 2024-01-22
Payer: COMMERCIAL

## 2024-01-22 ENCOUNTER — HOSPITAL ENCOUNTER (OUTPATIENT)
Facility: HOSPITAL | Age: 64
Setting detail: SPECIMEN
Discharge: HOME | End: 2024-01-25
Payer: COMMERCIAL

## 2024-01-22 VITALS
OXYGEN SATURATION: 99 % | DIASTOLIC BLOOD PRESSURE: 85 MMHG | SYSTOLIC BLOOD PRESSURE: 129 MMHG | WEIGHT: 217 LBS | HEIGHT: 66 IN | BODY MASS INDEX: 34.87 KG/M2 | RESPIRATION RATE: 14 BRPM | HEART RATE: 65 BPM | TEMPERATURE: 98 F

## 2024-01-22 DIAGNOSIS — Z12.31 ENCOUNTER FOR SCREENING MAMMOGRAM FOR MALIGNANT NEOPLASM OF BREAST: ICD-10-CM

## 2024-01-22 DIAGNOSIS — Z23 FLU VACCINE NEED: ICD-10-CM

## 2024-01-22 DIAGNOSIS — Z76.89 ESTABLISHING CARE WITH NEW DOCTOR, ENCOUNTER FOR: Primary | ICD-10-CM

## 2024-01-22 DIAGNOSIS — H81.10 BENIGN PAROXYSMAL POSITIONAL VERTIGO, UNSPECIFIED LATERALITY: ICD-10-CM

## 2024-01-22 LAB
ANION GAP SERPL CALC-SCNC: 2 MMOL/L (ref 3–18)
BUN SERPL-MCNC: 12 MG/DL (ref 7–18)
BUN/CREAT SERPL: 13 (ref 12–20)
CALCIUM SERPL-MCNC: 9.5 MG/DL (ref 8.5–10.1)
CHLORIDE SERPL-SCNC: 108 MMOL/L (ref 100–111)
CHOLEST SERPL-MCNC: 189 MG/DL
CO2 SERPL-SCNC: 30 MMOL/L (ref 21–32)
CREAT SERPL-MCNC: 0.89 MG/DL (ref 0.6–1.3)
ERYTHROCYTE [DISTWIDTH] IN BLOOD BY AUTOMATED COUNT: 12 % (ref 11.6–14.5)
EST. AVERAGE GLUCOSE BLD GHB EST-MCNC: 108 MG/DL
GLUCOSE SERPL-MCNC: 92 MG/DL (ref 74–99)
HBA1C MFR BLD: 5.4 % (ref 4.2–5.6)
HCT VFR BLD AUTO: 40.9 % (ref 35–45)
HDLC SERPL-MCNC: 38 MG/DL (ref 40–60)
HDLC SERPL: 5 (ref 0–5)
HGB BLD-MCNC: 13.3 G/DL (ref 12–16)
LDLC SERPL CALC-MCNC: 130.8 MG/DL (ref 0–100)
LIPID PANEL: ABNORMAL
MCH RBC QN AUTO: 29.2 PG (ref 24–34)
MCHC RBC AUTO-ENTMCNC: 32.5 G/DL (ref 31–37)
MCV RBC AUTO: 89.9 FL (ref 78–100)
NRBC # BLD: 0 K/UL (ref 0–0.01)
NRBC BLD-RTO: 0 PER 100 WBC
PLATELET # BLD AUTO: 241 K/UL (ref 135–420)
PMV BLD AUTO: 12.5 FL (ref 9.2–11.8)
POTASSIUM SERPL-SCNC: 4.2 MMOL/L (ref 3.5–5.5)
RBC # BLD AUTO: 4.55 M/UL (ref 4.2–5.3)
SODIUM SERPL-SCNC: 140 MMOL/L (ref 136–145)
TRIGL SERPL-MCNC: 101 MG/DL
VLDLC SERPL CALC-MCNC: 20.2 MG/DL
WBC # BLD AUTO: 4.5 K/UL (ref 4.6–13.2)

## 2024-01-22 PROCEDURE — 80061 LIPID PANEL: CPT

## 2024-01-22 PROCEDURE — 85027 COMPLETE CBC AUTOMATED: CPT

## 2024-01-22 PROCEDURE — 86803 HEPATITIS C AB TEST: CPT

## 2024-01-22 PROCEDURE — 36415 COLL VENOUS BLD VENIPUNCTURE: CPT

## 2024-01-22 PROCEDURE — 90674 CCIIV4 VAC NO PRSV 0.5 ML IM: CPT | Performed by: STUDENT IN AN ORGANIZED HEALTH CARE EDUCATION/TRAINING PROGRAM

## 2024-01-22 PROCEDURE — 83036 HEMOGLOBIN GLYCOSYLATED A1C: CPT

## 2024-01-22 PROCEDURE — 90471 IMMUNIZATION ADMIN: CPT | Performed by: STUDENT IN AN ORGANIZED HEALTH CARE EDUCATION/TRAINING PROGRAM

## 2024-01-22 PROCEDURE — 99214 OFFICE O/P EST MOD 30 MIN: CPT | Performed by: STUDENT IN AN ORGANIZED HEALTH CARE EDUCATION/TRAINING PROGRAM

## 2024-01-22 PROCEDURE — 80048 BASIC METABOLIC PNL TOTAL CA: CPT

## 2024-01-22 PROCEDURE — 87389 HIV-1 AG W/HIV-1&-2 AB AG IA: CPT

## 2024-01-22 SDOH — ECONOMIC STABILITY: FOOD INSECURITY: WITHIN THE PAST 12 MONTHS, YOU WORRIED THAT YOUR FOOD WOULD RUN OUT BEFORE YOU GOT MONEY TO BUY MORE.: NEVER TRUE

## 2024-01-22 SDOH — ECONOMIC STABILITY: HOUSING INSECURITY
IN THE LAST 12 MONTHS, WAS THERE A TIME WHEN YOU DID NOT HAVE A STEADY PLACE TO SLEEP OR SLEPT IN A SHELTER (INCLUDING NOW)?: NO

## 2024-01-22 SDOH — ECONOMIC STABILITY: FOOD INSECURITY: WITHIN THE PAST 12 MONTHS, THE FOOD YOU BOUGHT JUST DIDN'T LAST AND YOU DIDN'T HAVE MONEY TO GET MORE.: NEVER TRUE

## 2024-01-22 SDOH — ECONOMIC STABILITY: INCOME INSECURITY: HOW HARD IS IT FOR YOU TO PAY FOR THE VERY BASICS LIKE FOOD, HOUSING, MEDICAL CARE, AND HEATING?: NOT HARD AT ALL

## 2024-01-22 ASSESSMENT — PATIENT HEALTH QUESTIONNAIRE - PHQ9
SUM OF ALL RESPONSES TO PHQ QUESTIONS 1-9: 0
SUM OF ALL RESPONSES TO PHQ QUESTIONS 1-9: 0
2. FEELING DOWN, DEPRESSED OR HOPELESS: 0
SUM OF ALL RESPONSES TO PHQ9 QUESTIONS 1 & 2: 0
SUM OF ALL RESPONSES TO PHQ QUESTIONS 1-9: 0
SUM OF ALL RESPONSES TO PHQ QUESTIONS 1-9: 0
1. LITTLE INTEREST OR PLEASURE IN DOING THINGS: 0

## 2024-01-22 ASSESSMENT — ENCOUNTER SYMPTOMS
SINUS PAIN: 0
SHORTNESS OF BREATH: 0
CHEST TIGHTNESS: 0

## 2024-01-22 NOTE — PROGRESS NOTES
Georgina Glasgow is a 63 y.o. female (: 1960) presenting to address:    Chief Complaint   Patient presents with    Establish Care       Vitals:    24 0912   BP: 129/85   Pulse: 65   Resp: 14   Temp: 98 °F (36.7 °C)   SpO2: 99%       Coordination of Care Questionaire:   1. \"Have you been to the ER, urgent care clinic since your last visit?  Hospitalized since your last visit?\" No    2. \"Have you seen or consulted any other health care providers outside of the [facility] since your last visit?\" No     3. For patients aged 45-75: Has the patient had a colonoscopy / FIT/ Cologuard? NO      If the patient is female:    4. For patients aged 40-74: Has the patient had a mammogram within the past 2 years? Yes - Care Gap present. Most recent result on file      5. For patients aged 21-65: Has the patient had a pap smear? Yes - Care Gap present. Most recent result on file    Advanced Directive:   1. Do you have an Advanced Directive? No    2. Would you like information on Advanced Directives? No      Immunizations Administered       Name Date Dose Route    Influenza, FLUCELVAX, (age 6 mo+), MDCK, PF, 0.5mL 2024 0.5 mL Intramuscular    Site: Deltoid- Left    Lot: [id number]    NDC: 91054-528-98            
warm and dry.   Neurological:      General: No focal deficit present.      Mental Status: She is alert and oriented to person, place, and time.   Psychiatric:         Mood and Affect: Mood normal.         Behavior: Behavior normal.          ASSESSMENT and PLAN    Georgina was seen today for establish care.    Diagnoses and all orders for this visit:    Establishing care with new doctor, encounter for  Comments:  fu in 1 year    Encounter for screening mammogram for malignant neoplasm of breast  -     ANA DIGITAL SCREEN W OR WO CAD BILATERAL; Future  -     CBC; Future  -     Basic Metabolic Panel; Future  -     Hemoglobin A1C; Future  -     Lipid Panel; Future  -     HIV 1/2 Ag/Ab, 4TH Generation,W Rflx Confirm; Future  -     Hepatitis C Ab, Rflx to Qt by PCR; Future    Flu vaccine need    Benign paroxysmal positional vertigo, unspecified laterality  Comments:  provided epley maneuver, fu if continues or worsens    Other orders  -     Influenza, FLUCELVAX, (age 6 mo+), IM, PF, 0.5 mL                 Gail Murillo D.O.      PLEASE NOTE:   This document has been produced using voice recognition software. Unrecognized errors in transcription may be present.

## 2024-01-23 LAB
HIV 1+2 AB+HIV1 P24 AG SERPL QL IA: NONREACTIVE
HIV 1/2 RESULT COMMENT: NORMAL

## 2024-01-25 LAB
HCV AB SERPL QL IA: NORMAL
HCV IGG SERPL QL IA: NON REACTIVE S/CO RATIO

## 2024-04-02 LAB — MAMMOGRAPHY, EXTERNAL: NORMAL

## 2024-07-22 ENCOUNTER — OFFICE VISIT (OUTPATIENT)
Dept: FAMILY MEDICINE CLINIC | Facility: CLINIC | Age: 64
End: 2024-07-22
Payer: COMMERCIAL

## 2024-07-22 VITALS
DIASTOLIC BLOOD PRESSURE: 79 MMHG | WEIGHT: 212 LBS | SYSTOLIC BLOOD PRESSURE: 124 MMHG | TEMPERATURE: 97.6 F | RESPIRATION RATE: 16 BRPM | BODY MASS INDEX: 33.27 KG/M2 | HEIGHT: 67 IN | OXYGEN SATURATION: 96 % | HEART RATE: 69 BPM

## 2024-07-22 DIAGNOSIS — R05.3 CHRONIC COUGH: Primary | ICD-10-CM

## 2024-07-22 PROCEDURE — 99213 OFFICE O/P EST LOW 20 MIN: CPT | Performed by: STUDENT IN AN ORGANIZED HEALTH CARE EDUCATION/TRAINING PROGRAM

## 2024-07-22 RX ORDER — CALCIUM CARBONATE 500 MG/1
1 TABLET, CHEWABLE ORAL DAILY
COMMUNITY

## 2024-07-22 RX ORDER — FAMOTIDINE 20 MG/1
10 TABLET, FILM COATED ORAL 2 TIMES DAILY
COMMUNITY

## 2024-07-22 RX ORDER — FLUTICASONE PROPIONATE 50 MCG
2 SPRAY, SUSPENSION (ML) NASAL DAILY
Qty: 16 G | Refills: 0 | Status: SHIPPED | OUTPATIENT
Start: 2024-07-22

## 2024-07-22 RX ORDER — OMEPRAZOLE 10 MG/1
10 CAPSULE, DELAYED RELEASE ORAL DAILY
COMMUNITY

## 2024-07-22 ASSESSMENT — ENCOUNTER SYMPTOMS
SINUS PAIN: 0
SHORTNESS OF BREATH: 0
CHEST TIGHTNESS: 0

## 2024-07-22 NOTE — PROGRESS NOTES
[facility]    HISTORY OF PRESENT ILLNESS  Georgina Glasgow  is a 63 y.o. y.o. female here for acute visit    Coughing and throat clearing x 6  mo  -unclear if GERD vs postnasal drip  -Has been over Prilosec 10, Pepcid 20 occasionally, and Tums without relief  -feeling a little better today  -worse when she first wakes up in the AM  -better with hot coffee  -clearing throat often  -no burning in chest, no change with food  -hears wheezing sometimes when breathing      Mr#: [id number]      Past Medical History:   Diagnosis Date    Back pain 2015       Past Surgical History:   Procedure Laterality Date    GYN  2007    fibroid embolization    TUBAL LIGATION         Family History   Problem Relation Age of Onset    Cancer Paternal Grandfather         N/A    Stroke Paternal Grandmother 76        N/A    Heart Disease Maternal Grandfather 84        MI    Hypertension Father     Diabetes Father         DIABETES 2    Diabetes Mother         DIABETES 2    Asthma Mother         N/A       No Known Allergies    Social History     Tobacco Use   Smoking Status Former    Current packs/day: 0.00    Types: Cigarettes    Quit date: 3/7/1982    Years since quittin.4   Smokeless Tobacco Never       Social History     Substance and Sexual Activity   Alcohol Use Yes    Alcohol/week: 0.8 standard drinks of alcohol       Immunization History   Administered Date(s) Administered    COVID-19, MODERNA BLUE border, Primary or Immunocompromised, (age 12y+), IM, 100 mcg/0.5mL 2021, 12/15/2021    Influenza Quadv 2019    Influenza Trivalent 2015    Influenza Virus Vaccine 2018, 2019, 10/08/2019, 10/06/2020, 2022    Influenza, FLUARIX, FLULAVAL, FLUZONE (age 6 mo+) AND AFLURIA, (age 3 y+), PF, 0.5mL 2018, 10/08/2019, 10/06/2020    Influenza, FLUCELVAX, (age 6 mo+), MDCK, PF, 0.5mL 2024    Influenza, Triv, 3 Years and older, IM (Afluria (5 yrs and older)

## 2024-07-22 NOTE — PROGRESS NOTES
Georgina EckertIsaiYari is a 63 y.o. female (: 1960) presenting to address:    Chief Complaint   Patient presents with    Follow-up     Acid Reflux for 6 months now- Breathing really hard in AM  Cough that won't go away    Took Pepcid, Prilosec, and Tums    Feels like Airway is blocked       Vitals:    24 0952   BP: 124/79   Pulse: 69   Resp: 16   Temp: 97.6 °F (36.4 °C)   SpO2: 96%       \"Have you been to the ER, urgent care clinic since your last visit?  Hospitalized since your last visit?\"    NO    “Have you seen or consulted any other health care providers outside of [facility] since your last visit?”    NO    “Have you had a colorectal cancer screening such as a colonoscopy/FIT/Cologuard?    NO    Date of last Colonoscopy: 2013  No cologuard on file  No FIT/FOBT on file   No flexible sigmoidoscopy on file         “Have you had a pap smear?”    NO    Date of last Cervical Cancer screen (HPV or PAP): 2021